# Patient Record
Sex: MALE | Race: OTHER | Employment: FULL TIME | URBAN - METROPOLITAN AREA
[De-identification: names, ages, dates, MRNs, and addresses within clinical notes are randomized per-mention and may not be internally consistent; named-entity substitution may affect disease eponyms.]

---

## 2017-02-13 ENCOUNTER — OFFICE VISIT (OUTPATIENT)
Dept: FAMILY MEDICINE CLINIC | Age: 37
End: 2017-02-13

## 2017-02-13 VITALS
HEIGHT: 72 IN | HEART RATE: 119 BPM | RESPIRATION RATE: 22 BRPM | DIASTOLIC BLOOD PRESSURE: 102 MMHG | TEMPERATURE: 95.9 F | WEIGHT: 213 LBS | SYSTOLIC BLOOD PRESSURE: 131 MMHG | BODY MASS INDEX: 28.85 KG/M2 | OXYGEN SATURATION: 99 %

## 2017-02-13 DIAGNOSIS — M54.5 ACUTE RIGHT-SIDED LOW BACK PAIN, WITH SCIATICA PRESENCE UNSPECIFIED: Primary | ICD-10-CM

## 2017-02-13 RX ORDER — OXYCODONE AND ACETAMINOPHEN 5; 325 MG/1; MG/1
1 TABLET ORAL
Qty: 10 TAB | Refills: 0 | Status: SHIPPED | OUTPATIENT
Start: 2017-02-13 | End: 2017-04-28 | Stop reason: ALTCHOICE

## 2017-02-13 RX ORDER — KETOROLAC TROMETHAMINE 30 MG/ML
60 INJECTION, SOLUTION INTRAMUSCULAR; INTRAVENOUS ONCE
Qty: 2 VIAL | Refills: 0 | Status: SHIPPED | COMMUNITY
Start: 2017-02-13 | End: 2017-02-13

## 2017-02-13 RX ORDER — ALPRAZOLAM 0.5 MG/1
TABLET ORAL
COMMUNITY

## 2017-02-13 RX ORDER — TIZANIDINE 4 MG/1
4 TABLET ORAL
Qty: 30 TAB | Refills: 0 | Status: SHIPPED | OUTPATIENT
Start: 2017-02-13 | End: 2017-04-28 | Stop reason: ALTCHOICE

## 2017-02-13 NOTE — PATIENT INSTRUCTIONS
Over the next 3 days please ice the back regularly for 15 minutes every 3 hours while awake. Avoid lifting greater than 10 pounds over the next week. Please use ibuprofen 800mg three times a day with food for the next week, take with this food. Engage in stretches below in  3 days, heat up first with heating pad for 10 minutes and then cool down after with ice for 15 minutes. Try to do these exercises twice daily. Back Stretches: Exercises  Your Care Instructions  Here are some examples of exercises for stretching your back. Start each exercise slowly. Ease off the exercise if you start to have pain. Your doctor or physical therapist will tell you when you can start these exercises and which ones will work best for you. How to do the exercises  Overhead stretch    1. Stand comfortably with your feet shoulder-width apart. 2. Looking straight ahead, raise both arms over your head and reach toward the ceiling. Do not allow your head to tilt back. 3. Hold for 15 to 30 seconds, then lower your arms to your sides. 4. Repeat 2 to 4 times. Side stretch    1. Stand comfortably with your feet shoulder-width apart. 2. Raise one arm over your head, and then lean to the other side. 3. Slide your hand down your leg as you let the weight of your arm gently stretch your side muscles. Hold for 15 to 30 seconds. 4. Repeat 2 to 4 times on each side. Press-up    1. Lie on your stomach, supporting your body with your forearms. 2. Press your elbows down into the floor to raise your upper back. As you do this, relax your stomach muscles and allow your back to arch without using your back muscles. As your press up, do not let your hips or pelvis come off the floor. 3. Hold for 15 to 30 seconds, then relax. 4. Repeat 2 to 4 times. Relax and rest    1. Lie on your back with a rolled towel under your neck and a pillow under your knees. Extend your arms comfortably to your sides.   2. Relax and breathe normally. 3. Remain in this position for about 10 minutes. 4. If you can, do this 2 or 3 times each day. Follow-up care is a key part of your treatment and safety. Be sure to make and go to all appointments, and call your doctor if you are having problems. It's also a good idea to know your test results and keep a list of the medicines you take. Where can you learn more? Go to http://alize-maryann.info/. Enter O325 in the search box to learn more about \"Back Stretches: Exercises. \"  Current as of: May 23, 2016  Content Version: 11.1  © 1844-3223 [employer]. Care instructions adapted under license by "[employer] (which disclaims liability or warranty for this information). If you have questions about a medical condition or this instruction, always ask your healthcare professional. [address] any warranty or liability for your use of this information.

## 2017-02-13 NOTE — PROGRESS NOTES
Caitlyn Edwards is a 43 y.o. male in today with c/o back injury this morning at home. Learning assessment previously completed; primary language is [place]. 1. Have you been to the ER, urgent care clinic since your last visit? Hospitalized since your last visit? No    2. Have you seen or consulted any other health care providers outside of the [address] [place] [address] since your last visit? Include any pap smears or colon screening.  No

## 2017-02-13 NOTE — LETTER
NOTIFICATION RETURN TO WORK / SCHOOL 
 
2/13/2017 2:45 PM 
 
Mr. Hernesto Carballo [address] [address] To Whom It May Concern: 
 
Hernesto Carballo is currently under the care of Doroteo Cordero. He will return to work/school on: 2/15/17 If there are questions or concerns please have the patient contact our office.  
 
 
 
Sincerely, 
 
 
[address] France MD

## 2017-02-13 NOTE — PROGRESS NOTES
Back Injury        HPI: Shazia Davey is a 43 y.o. male WHITE [place]   Here with acute back pain after moving a 35# post this morning. He was doing well earlier today as I had seen him this morning at an appointment with his son. He reports as soon as he moved the post he felt a tweak in his back. He has had off/on back pain in the past however reports this is severe. Past Medical History   Diagnosis Date    ADHD (attention deficit hyperactivity disorder)     GERD (gastroesophageal reflux disease)     Hematuria        Current Outpatient Prescriptions   Medication Sig    ALPRAZolam (XANAX) 0.5 mg tablet Take  by mouth.  ibuprofen (MOTRIN) 800 mg tablet Take 1 Tab by mouth every eight (8) hours as needed for Pain.  dextroamphetamine-amphetamine (ADDERALL) 30 mg tablet Take 30 mg by mouth three (3) times daily.  oxyCODONE-acetaminophen (PERCOCET) 5-325 mg per tablet Take 1 Tab by mouth every eight (8) hours as needed for Pain. Max Daily Amount: 3 Tabs. Indications: PAIN, medial meniscal tear    oxyCODONE-acetaminophen (PERCOCET 10)  mg per tablet Take 1 Tab by mouth every eight (8) hours as needed for Pain. Max Daily Amount: 3 Tabs.  TRAZODONE HCL (TRAZODONE PO) Take  by mouth. No current facility-administered medications for this visit. Allergies   Allergen Reactions    Prednisone Swelling    Phenergan [Promethazine] Other (comments)     hallucinations    Tramadol Other (comments)     Patient complains of headaches.        Past Surgical History   Procedure Laterality Date    Hx appendectomy      Hx hernia repair       double hernia    Pr excise varicocele      Hx hip replacement Right 2014    Hx hip replacement Left 2013       Family History   Problem Relation Age of Onset    Thyroid Disease Mother     Hypertension Father     Heart Attack Father     Cancer Sister      lung    Ovarian Cancer Sister     Alcohol abuse Brother        Social History Social History    Marital status:      Spouse name: N/A    Number of children: N/A    Years of education: N/A     Occupational History          Social History Main Topics    Smoking status: Never Smoker    Smokeless tobacco: Never Used    Alcohol use 3.6 oz/week     6 Standard drinks or equivalent per week      Comment: weekend    Drug use: No    Sexual activity: Yes     Partners: Female     Other Topics Concern    Not on file     Social History Narrative       Review of Systems   Musculoskeletal: Positive for back pain. Negative for falls. Neurological: Negative for focal weakness. Visit Vitals    BP (!) 131/102 (BP 1 Location: Left arm, BP Patient Position: Standing)    Pulse (!) 119    Temp 95.9 °F (35.5 °C) (Oral)    Resp 22    Ht 6' (1.829 m)    Wt 213 lb (96.6 kg)    SpO2 99%    BMI 28.89 kg/m2       Physical Exam   Constitutional: He appears well-developed and well-nourished. He appears distressed. HENT:   Head: Normocephalic and atraumatic. Right Ear: External ear normal.   Left Ear: External ear normal.   Musculoskeletal:        Lumbar back: He exhibits decreased range of motion, tenderness (Right paraspinals, intrinsics) and spasm (On right). He exhibits no bony tenderness, no swelling and no deformity. Skin: Skin is warm. He is diaphoretic. Nursing note and vitals reviewed. Assesment:  1. Acute right-sided low back pain, with sciatica presence unspecified    - ketorolac (TORADOL) 30 mg/mL (1 mL) injection; 2 mL by IntraMUSCular route once for 1 dose. Dispense: 2 Vial; Refill: 0  - oxyCODONE-acetaminophen (PERCOCET) 5-325 mg per tablet; Take 1 Tab by mouth two (2) times daily as needed for Pain. Max Daily Amount: 2 Tabs. Dispense: 10 Tab; Refill: 0  - tiZANidine (ZANAFLEX) 4 mg tablet; Take 1 Tab by mouth three (3) times daily as needed. Indications: MUSCLE SPASM  Dispense: 30 Tab;  Refill: 0        I have discussed the diagnosis with the patient and the intended management  The patient has received an after-visit summary and questions were answered concerning future plans. I have discussed medication usage, side effects and warnings with the patient as well. I have reviewed the plan of care with the patient, accepted their input and they are in agreement with the treatment goals. Follow-up Disposition:  Return if symptoms worsen or fail to improve.       Jonetta Schilder, MD                .

## 2017-02-13 NOTE — MR AVS SNAPSHOT
Visit Information Date & Time Provider Department Dept. Phone Encounter #  
 2/13/2017  2:15 PM Patricia Juan ManuelFrancine morrell [phone]  [phone] [id number] Follow-up Instructions Return if symptoms worsen or fail to improve. Upcoming Health Maintenance Date Due DTaP/Tdap/Td series (1 - Tdap) 3/9/1999 INFLUENZA AGE 9 TO ADULT 8/1/2016 Allergies as of 2/13/2017  Review Complete On: 2/13/2017 By: Pennie Upton LPN Severity Noted Reaction Type Reactions Prednisone Medium 10/19/2015    Swelling Phenergan [Promethazine]  05/23/2011    Other (comments)  
 hallucinations Tramadol  10/19/2015    Other (comments) Patient complains of headaches. Current Immunizations  Never Reviewed Name Date Influenza Vaccine PF 10/19/2015 Not reviewed this visit You Were Diagnosed With   
  
 Codes Comments Acute right-sided low back pain, with sciatica presence unspecified    -  Primary ICD-10-CM: M54.5 ICD-9-CM: 724.2 Vitals BP Pulse Temp Resp Height(growth percentile) Weight(growth percentile) (!) 147/112 (BP 1 Location: Left arm, BP Patient Position: Standing) (!) 122 95.9 °F (35.5 °C) (Oral) 22 6' (1.829 m) 213 lb (96.6 kg) SpO2 BMI Smoking Status 99% 28.89 kg/m2 Never Smoker Vitals History BMI and BSA Data Body Mass Index Body Surface Area  
 28.89 kg/m 2 2.22 m 2 Preferred Pharmacy Pharmacy Name Phone [address] [phone] Your Updated Medication List  
  
   
This list is accurate as of: 2/13/17  2:48 PM.  Always use your most recent med list.  
  
  
  
  
 ADDERALL 30 mg tablet Generic drug:  dextroamphetamine-amphetamine Take 30 mg by mouth three (3) times daily. ibuprofen 800 mg tablet Commonly known as:  MOTRIN Take 1 Tab by mouth every eight (8) hours as needed for Pain. ketorolac 30 mg/mL (1 mL) injection Commonly known as:  TORADOL  
2 mL by IntraMUSCular route once for 1 dose. oxyCODONE-acetaminophen 5-325 mg per tablet Commonly known as:  PERCOCET Take 1 Tab by mouth two (2) times daily as needed for Pain. Max Daily Amount: 2 Tabs. tiZANidine 4 mg tablet Commonly known as:  [place] Take 1 Tab by mouth three (3) times daily as needed. Indications: MUSCLE SPASM TRAZODONE PO Take  by mouth. XANAX 0.5 mg tablet Generic drug:  ALPRAZolam  
Take  by mouth. Prescriptions Printed Refills  
 oxyCODONE-acetaminophen (PERCOCET) 5-325 mg per tablet 0 Sig: Take 1 Tab by mouth two (2) times daily as needed for Pain. Max Daily Amount: 2 Tabs. Class: Print Route: Oral  
  
Prescriptions Sent to Pharmacy Refills  
 tiZANidine (ZANAFLEX) 4 mg tablet 0 Sig: Take 1 Tab by mouth three (3) times daily as needed. Indications: MUSCLE SPASM Class: Normal  
 Pharmacy: [address], [address] [place] [address]. Jesus Gallegos [address]  #: [phone] Route: Oral  
  
Follow-up Instructions Return if symptoms worsen or fail to improve. Patient Instructions Over the next 3 days please ice the back regularly for 15 minutes every 3 hours while awake. Avoid lifting greater than 10 pounds over the next week. Please use ibuprofen 800mg three times a day with food for the next week, take with this food. Engage in stretches below in  3 days, heat up first with heating pad for 10 minutes and then cool down after with ice for 15 minutes. Try to do these exercises twice daily. Back Stretches: Exercises Your Care Instructions Here are some examples of exercises for stretching your back. Start each exercise slowly. Ease off the exercise if you start to have pain. Your doctor or physical therapist will tell you when you can start these exercises and which ones will work best for you. How to do the exercises Overhead stretch 1. Stand comfortably with your feet shoulder-width apart. 2. Looking straight ahead, raise both arms over your head and reach toward the ceiling. Do not allow your head to tilt back. 3. Hold for 15 to 30 seconds, then lower your arms to your sides. 4. Repeat 2 to 4 times. Side stretch 1. Stand comfortably with your feet shoulder-width apart. 2. Raise one arm over your head, and then lean to the other side. 3. Slide your hand down your leg as you let the weight of your arm gently stretch your side muscles. Hold for 15 to 30 seconds. 4. Repeat 2 to 4 times on each side. Press-up 1. Lie on your stomach, supporting your body with your forearms. 2. Press your elbows down into the floor to raise your upper back. As you do this, relax your stomach muscles and allow your back to arch without using your back muscles. As your press up, do not let your hips or pelvis come off the floor. 3. Hold for 15 to 30 seconds, then relax. 4. Repeat 2 to 4 times. Relax and rest 
 
1. Lie on your back with a rolled towel under your neck and a pillow under your knees. Extend your arms comfortably to your sides. 2. Relax and breathe normally. 3. Remain in this position for about 10 minutes. 4. If you can, do this 2 or 3 times each day. Follow-up care is a key part of your treatment and safety. Be sure to make and go to all appointments, and call your doctor if you are having problems. It's also a good idea to know your test results and keep a list of the medicines you take. Where can you learn more? Go to http://alize-maryann.info/. Enter Y049 in the search box to learn more about \"Back Stretches: Exercises. \" Current as of: May 23, 2016 Content Version: 11.1 © 9689-4984 [employer].  Care instructions adapted under license by [employer] (which disclaims liability or warranty for this information). If you have questions about a medical condition or this instruction, always ask your healthcare professional. [facility] [address] any warranty or liability for your use of this information. Introducing [facility] SERVICES! Sangeetha Fisher introduces Hiveoo patient portal. Now you can access parts of your medical record, email your doctor's office, and request medication refills online. 1. In your internet browser, go to [email or web address] 2. Click on the First Time User? Click Here link in the Sign In box. You will see the New Member Sign Up page. 3. Enter your Hiveoo Access Code exactly as it appears below. You will not need to use this code after youve completed the sign-up process. If you do not sign up before the expiration date, you must request a new code. · Hiveoo Access Code: [email or web address] Expires: 5/14/2017  2:48 PM 
 
4. Enter the last four digits of your Social Security Number (xxxx) and Date of Birth (mm/dd/yyyy) as indicated and click Submit. You will be taken to the next sign-up page. 5. Create a Hiveoo ID. This will be your Hiveoo login ID and cannot be changed, so think of one that is secure and easy to remember. 6. Create a Hiveoo password. You can change your password at any time. 7. Enter your Password Reset Question and Answer. This can be used at a later time if you forget your password. 8. Enter your e-mail address. You will receive e-mail notification when new information is available in [address] 9. Click Sign Up. You can now view and download portions of your medical record. 10. Click the Download Summary menu link to download a portable copy of your medical information. If you have questions, please visit the Frequently Asked Questions section of the Hiveoo website. Remember, Hiveoo is NOT to be used for urgent needs. For medical emergencies, dial 911. Now available from your iPhone and Android! Please provide this summary of care documentation to your next provider. Your primary care clinician is listed as Carolina Lockhart. If you have any questions after today's visit, please call [phone].

## 2017-02-14 ENCOUNTER — TELEPHONE (OUTPATIENT)
Dept: FAMILY MEDICINE CLINIC | Age: 37
End: 2017-02-14

## 2017-02-14 NOTE — TELEPHONE ENCOUNTER
Pt states since this morning her is itchy all over and his face has a burning sensation and is red. Per Dr. India Perez, this is likely a reaction to the percocet. Advised pt to stop taking percocet and take benadryl for the reaction, if develops any difficulty breathing or feeling that throat is closing up seek emergency medical attention.

## 2017-03-15 ENCOUNTER — OFFICE VISIT (OUTPATIENT)
Dept: FAMILY MEDICINE CLINIC | Age: 37
End: 2017-03-15

## 2017-03-15 VITALS
SYSTOLIC BLOOD PRESSURE: 133 MMHG | OXYGEN SATURATION: 100 % | HEART RATE: 115 BPM | TEMPERATURE: 97.4 F | BODY MASS INDEX: 29.53 KG/M2 | DIASTOLIC BLOOD PRESSURE: 95 MMHG | HEIGHT: 72 IN | RESPIRATION RATE: 18 BRPM | WEIGHT: 218 LBS

## 2017-03-15 DIAGNOSIS — L03.312 CELLULITIS OF UPPER BACK EXCLUDING SCAPULAR REGION: Primary | ICD-10-CM

## 2017-03-15 RX ORDER — CEPHALEXIN 500 MG/1
500 CAPSULE ORAL 4 TIMES DAILY
Qty: 28 CAP | Refills: 0 | Status: SHIPPED | OUTPATIENT
Start: 2017-03-15 | End: 2017-03-22

## 2017-03-15 NOTE — PATIENT INSTRUCTIONS

## 2017-03-15 NOTE — PROGRESS NOTES
Hernesto Carballo is a 42 y.o. male c/o pain, redness and swelling over L uuper back x few days, today it burned really bad.

## 2017-03-16 NOTE — PROGRESS NOTES
Back Pain        HPI: Hernesto Carballo is a 42 y.o. male WHITE [place]   Here with red, painful, itchy rash on back of neck/upper back. Started this past Sunday. He had swelling and a bump at this area yesterday but this resolved. Denies any fevers. Past Medical History:   Diagnosis Date    ADHD (attention deficit hyperactivity disorder)     GERD (gastroesophageal reflux disease)     Hematuria        Current Outpatient Prescriptions   Medication Sig    cephALEXin (KEFLEX) 500 mg capsule Take 1 Cap by mouth four (4) times daily for 7 days.  ALPRAZolam (XANAX) 0.5 mg tablet Take  by mouth.  ibuprofen (MOTRIN) 800 mg tablet Take 1 Tab by mouth every eight (8) hours as needed for Pain.  TRAZODONE HCL (TRAZODONE PO) Take  by mouth.  dextroamphetamine-amphetamine (ADDERALL) 30 mg tablet Take 30 mg by mouth three (3) times daily.  oxyCODONE-acetaminophen (PERCOCET) 5-325 mg per tablet Take 1 Tab by mouth two (2) times daily as needed for Pain. Max Daily Amount: 2 Tabs.  tiZANidine (ZANAFLEX) 4 mg tablet Take 1 Tab by mouth three (3) times daily as needed. Indications: MUSCLE SPASM     No current facility-administered medications for this visit. Allergies   Allergen Reactions    Prednisone Swelling    Phenergan [Promethazine] Other (comments)     hallucinations    Tramadol Other (comments)     Patient complains of headaches.        Past Surgical History:   Procedure Laterality Date    EXCISE VARICOCELE      HX APPENDECTOMY      HX HERNIA REPAIR      double hernia    HX HIP REPLACEMENT Right 2014    HX HIP REPLACEMENT Left 2013       Family History   Problem Relation Age of Onset    Thyroid Disease Mother     Hypertension Father     Heart Attack Father     Cancer Sister      lung    Ovarian Cancer Sister     Alcohol abuse Brother        Social History     Social History    Marital status:      Spouse name: N/A    Number of children: N/A    Years of education: N/A     Occupational History          Social History Main Topics    Smoking status: Never Smoker    Smokeless tobacco: Never Used    Alcohol use 3.6 oz/week     6 Standard drinks or equivalent per week      Comment: weekend    Drug use: No    Sexual activity: Yes     Partners: Female     Other Topics Concern    Not on file     Social History Narrative           Visit Vitals    BP (!) 133/95 (BP 1 Location: Right arm, BP Patient Position: Sitting)    Pulse (!) 115    Temp 97.4 °F (36.3 °C) (Oral)    Resp 18    Ht 6' (1.829 m)    Wt 218 lb (98.9 kg)    SpO2 100%    BMI 29.57 kg/m2       PE  Physical Exam   Constitutional: He appears well-developed and well-nourished. No distress. HENT:   Head: Normocephalic and atraumatic. Left Ear: External ear normal.   Skin: He is not diaphoretic. Psychiatric: He has a normal mood and affect. Nursing note reviewed. Assesment:  1. Cellulitis of upper back excluding scapular region  Apply cool compress. Use ibuprofen prn pain. Keflex x 7 days  - cephALEXin (KEFLEX) 500 mg capsule; Take 1 Cap by mouth four (4) times daily for 7 days. Dispense: 28 Cap; Refill: 0        I have discussed the diagnosis with the patient and the intended management  The patient has received an after-visit summary and questions were answered concerning future plans. I have discussed medication usage, side effects and warnings with the patient as well. I have reviewed the plan of care with the patient, accepted their input and they are in agreement with the treatment goals. Follow-up Disposition:  Return if symptoms worsen or fail to improve.       Sybil Bowling MD                .

## 2017-04-25 ENCOUNTER — OFFICE VISIT (OUTPATIENT)
Dept: FAMILY MEDICINE CLINIC | Age: 37
End: 2017-04-25

## 2017-04-25 VITALS
HEART RATE: 77 BPM | BODY MASS INDEX: 29.53 KG/M2 | TEMPERATURE: 97.5 F | OXYGEN SATURATION: 99 % | RESPIRATION RATE: 18 BRPM | WEIGHT: 218 LBS | DIASTOLIC BLOOD PRESSURE: 100 MMHG | SYSTOLIC BLOOD PRESSURE: 133 MMHG | HEIGHT: 72 IN

## 2017-04-25 DIAGNOSIS — R03.0 BLOOD PRESSURE ELEVATED WITHOUT HISTORY OF HTN: ICD-10-CM

## 2017-04-25 DIAGNOSIS — G44.009 CLUSTER HEADACHE, NOT INTRACTABLE, UNSPECIFIED CHRONICITY PATTERN: Primary | ICD-10-CM

## 2017-04-25 RX ORDER — SUMATRIPTAN 50 MG/1
TABLET, FILM COATED ORAL
Qty: 12 TAB | Refills: 1 | Status: SHIPPED | OUTPATIENT
Start: 2017-04-25

## 2017-04-25 NOTE — MR AVS SNAPSHOT
Visit Information Date & Time Provider Department Dept. Phone Encounter #  
 4/25/2017  2:00 PM Tramaine Coates, [address] [phone] [id number] Follow-up Instructions Return if symptoms worsen or fail to improve. Upcoming Health Maintenance Date Due DTaP/Tdap/Td series (1 - Tdap) 3/9/1999 INFLUENZA AGE 9 TO ADULT 8/1/2016 Allergies as of 4/25/2017  Review Complete On: 4/25/2017 By: eJff Macedo LPN Severity Noted Reaction Type Reactions Prednisone Medium 10/19/2015    Swelling Phenergan [Promethazine]  05/23/2011    Other (comments)  
 hallucinations Tramadol  10/19/2015    Other (comments) Patient complains of headaches. Current Immunizations  Never Reviewed Name Date Influenza Vaccine PF 10/19/2015 Not reviewed this visit You Were Diagnosed With   
  
 Codes Comments Cluster headache, not intractable, unspecified chronicity pattern    -  Primary ICD-10-CM: G44.009 ICD-9-CM: 339.00 Vitals BP Pulse Temp Resp Height(growth percentile) Weight(growth percentile) (!) 133/100 (BP 1 Location: Right arm, BP Patient Position: Sitting) 77 97.5 °F (36.4 °C) (Oral) 18 6' (1.829 m) 218 lb (98.9 kg) SpO2 BMI Smoking Status 99% 29.57 kg/m2 Never Smoker Vitals History BMI and BSA Data Body Mass Index Body Surface Area  
 29.57 kg/m 2 2.24 m 2 Preferred Pharmacy Pharmacy Name Phone [address], [address] [place] [address] [phone] Your Updated Medication List  
  
   
This list is accurate as of: 4/25/17  3:01 PM.  Always use your most recent med list.  
  
  
  
  
 ADDERALL 30 mg tablet Generic drug:  dextroamphetamine-amphetamine Take 30 mg by mouth three (3) times daily. ibuprofen 800 mg tablet Commonly known as:  MOTRIN Take 1 Tab by mouth every eight (8) hours as needed for Pain. oxyCODONE-acetaminophen 5-325 mg per tablet Commonly known as:  PERCOCET Take 1 Tab by mouth two (2) times daily as needed for Pain. Max Daily Amount: 2 Tabs. SUMAtriptan 50 mg tablet Commonly known as:  IMITREX  
1 tab at onset ok to use second tablet if headache not resolved in 2 hours. Max of 2 daily tiZANidine 4 mg tablet Commonly known as:  Ty Nicolle Take 1 Tab by mouth three (3) times daily as needed. Indications: MUSCLE SPASM TRAZODONE PO Take  by mouth. XANAX 0.5 mg tablet Generic drug:  ALPRAZolam  
Take  by mouth. Prescriptions Sent to Pharmacy Refills SUMAtriptan (IMITREX) 50 mg tablet 1 Si tab at onset ok to use second tablet if headache not resolved in 2 hours. Max of 2 daily Class: Normal  
 Pharmacy: [facility] [id number] [place] [address], [facility] . Jesus Gallegos 47  #: [phone] Follow-up Instructions Return if symptoms worsen or fail to improve. Patient Instructions Cluster Headache: Care Instructions Your Care Instructions Cluster headaches are very painful. They happen on one side of the head and often start at night. They can last for 30 minutes to several hours. They usually occur in groups, or clusters, over weeks or months. You may have a stuffy nose and watery eyes during the headaches. The cause of cluster headaches is not known. Medicine may help prevent cluster headaches. You also can try to avoid things that trigger your headaches. Follow-up care is a key part of your treatment and safety. Be sure to make and go to all appointments, and call your doctor if you are having problems. It's also a good idea to know your test results and keep a list of the medicines you take. How can you care for yourself at home? · Watch for new symptoms with a headache. These include fever, weakness or numbness, vision changes, or confusion. They may be signs of a more serious problem. · Be safe with medicines. Take your medicines exactly as prescribed. Call your doctor if you think you are having a problem with your medicine. You will get more details on the specific medicines your doctor prescribes. · If your doctor recommends it, take an over-the-counter pain medicine, such as acetaminophen (Tylenol), ibuprofen (Advil, Motrin), or naproxen (Aleve). Read and follow all instructions on the label. · Do not take two or more pain medicines at the same time unless the doctor told you to. Many pain medicines have acetaminophen, which is Tylenol. Too much acetaminophen (Tylenol) can be harmful. · Carry medicine with you to quickly treat a headache. · Put ice or a cold pack on the area for 10 to 20 minutes at a time. Put a thin cloth between the ice and your skin. · If your doctor prescribed at-home oxygen therapy to stop a cluster headache, follow the directions for using it. To prevent cluster headaches · Keep a headache diary. Avoiding triggers may help you prevent headaches. Write down when a headache begins, how long it lasts, and what might have triggered it. This could include stress, alcohol, or certain foods. · Exercise daily to lower stress. · Limit caffeine by not drinking too much coffee, tea, or soda. But do not quit caffeine suddenly. This can also give you headaches. · Do not smoke or allow others to smoke around you. If you need help quitting, talk to your doctor about stop-smoking programs and medicines. These can increase your chances of quitting for good. · Tell your doctor if your headaches get worse and medicines do not help. You may need to try a different medicine. When should you call for help? Call 911 anytime you think you may need emergency care. For example, call if: 
· You have symptoms of a stroke. These may include: 
¨ Sudden numbness, tingling, weakness, or loss of movement in your face, arm, or leg, especially on only one side of your body. ¨ Sudden vision changes. ¨ Sudden trouble speaking. ¨ Sudden confusion or trouble understanding simple statements. ¨ Sudden problems with walking or balance. ¨ A sudden, severe headache that is different from past headaches. Call your doctor now or seek immediate medical care if: 
· You have a fever with a stiff neck or a severe headache. · You are sensitive to light or feel very sleepy or confused. · You have new nausea and vomiting and you cannot keep down food or liquids. Watch closely for changes in your health, and be sure to contact your doctor if: 
· You have a headache that does not get better within 1 or 2 days. · Your headaches get worse or happen more often. Where can you learn more? Go to http://alize-maryann.info/. Enter Z001 in the search box to learn more about \"Cluster Headache: Care Instructions. \" Current as of: October 14, 2016 Content Version: 11.2 © 1188-2168 [employer]. Care instructions adapted under license by [employer] (which disclaims liability or warranty for this information). If you have questions about a medical condition or this instruction, always ask your healthcare professional. [place] [address] any warranty or liability for your use of this information. Introducing [facility]! Tunde Hines introduces [employer] patient portal. Now you can access parts of your medical record, email your doctor's office, and request medication refills online. 1. In your internet browser, go to [email or web address] 2. Click on the First Time User? Click Here link in the Sign In box. You will see the New Member Sign Up page. 3. Enter your [employer] Access Code exactly as it appears below. You will not need to use this code after youve completed the sign-up process. If you do not sign up before the expiration date, you must request a new code. · [employer] Access Code: [email or web address] Expires: 5/14/2017  3:48 PM 
 
4. Enter the last four digits of your Social Security Number (xxxx) and Date of Birth (mm/dd/yyyy) as indicated and click Submit. You will be taken to the next sign-up page. 5. Create a [employer]ial Exchange ID. This will be your [employer] Exchange login ID and cannot be changed, so think of one that is secure and easy to remember. 6. Create a [employer] password. You can change your password at any time. 7. Enter your Password Reset Question and Answer. This can be used at a later time if you forget your password. 8. Enter your e-mail address. You will receive e-mail notification when new information is available in [address] 9. Click Sign Up. You can now view and download portions of your medical record. 10. Click the Download Summary menu link to download a portable copy of your medical information. If you have questions, please visit the Frequently Asked Questions section of the [employer] website. Remember, [employer] is NOT to be used for urgent needs. For medical emergencies, dial 911. Now available from your iPhone and Android! Please provide this summary of care documentation to your next provider. Your primary care clinician is listed as Kyara Enamorado. If you have any questions after today's visit, please call [phone].

## 2017-04-25 NOTE — PATIENT INSTRUCTIONS
Cluster Headache: Care Instructions  Your Care Instructions  Cluster headaches are very painful. They happen on one side of the head and often start at night. They can last for 30 minutes to several hours. They usually occur in groups, or clusters, over weeks or months. You may have a stuffy nose and watery eyes during the headaches. The cause of cluster headaches is not known. Medicine may help prevent cluster headaches. You also can try to avoid things that trigger your headaches. Follow-up care is a key part of your treatment and safety. Be sure to make and go to all appointments, and call your doctor if you are having problems. It's also a good idea to know your test results and keep a list of the medicines you take. How can you care for yourself at home? · Watch for new symptoms with a headache. These include fever, weakness or numbness, vision changes, or confusion. They may be signs of a more serious problem. · Be safe with medicines. Take your medicines exactly as prescribed. Call your doctor if you think you are having a problem with your medicine. You will get more details on the specific medicines your doctor prescribes. · If your doctor recommends it, take an over-the-counter pain medicine, such as acetaminophen (Tylenol), ibuprofen (Advil, Motrin), or naproxen (Aleve). Read and follow all instructions on the label. · Do not take two or more pain medicines at the same time unless the doctor told you to. Many pain medicines have acetaminophen, which is Tylenol. Too much acetaminophen (Tylenol) can be harmful. · Carry medicine with you to quickly treat a headache. · Put ice or a cold pack on the area for 10 to 20 minutes at a time. Put a thin cloth between the ice and your skin. · If your doctor prescribed at-home oxygen therapy to stop a cluster headache, follow the directions for using it. To prevent cluster headaches  · Keep a headache diary.  Avoiding triggers may help you prevent headaches. Write down when a headache begins, how long it lasts, and what might have triggered it. This could include stress, alcohol, or certain foods. · Exercise daily to lower stress. · Limit caffeine by not drinking too much coffee, tea, or soda. But do not quit caffeine suddenly. This can also give you headaches. · Do not smoke or allow others to smoke around you. If you need help quitting, talk to your doctor about stop-smoking programs and medicines. These can increase your chances of quitting for good. · Tell your doctor if your headaches get worse and medicines do not help. You may need to try a different medicine. When should you call for help? Call 911 anytime you think you may need emergency care. For example, call if:  · You have symptoms of a stroke. These may include:  ¨ Sudden numbness, tingling, weakness, or loss of movement in your face, arm, or leg, especially on only one side of your body. ¨ Sudden vision changes. ¨ Sudden trouble speaking. ¨ Sudden confusion or trouble understanding simple statements. ¨ Sudden problems with walking or balance. ¨ A sudden, severe headache that is different from past headaches. Call your doctor now or seek immediate medical care if:  · You have a fever with a stiff neck or a severe headache. · You are sensitive to light or feel very sleepy or confused. · You have new nausea and vomiting and you cannot keep down food or liquids. Watch closely for changes in your health, and be sure to contact your doctor if:  · You have a headache that does not get better within 1 or 2 days. · Your headaches get worse or happen more often. Where can you learn more? Go to http://alize-maryann.info/. Enter L587 in the search box to learn more about \"Cluster Headache: Care Instructions. \"  Current as of: October 14, 2016  Content Version: 11.2  © 0172-1462 [employer].  Care instructions adapted under license by [employer] (which disclaims liability or warranty for this information). If you have questions about a medical condition or this instruction, always ask your healthcare professional. [address] any warranty or liability for your use of this information.

## 2017-04-25 NOTE — PROGRESS NOTES
Dominik Chapman is a 42 y.o. male here today with c/o headache every day for a little more than 3 weeks. States the pain is around and behind his eyes. States he feels hot but doesn't think he has a fever. He has a post nasal drip     Learning assessment previously completed. 1. Have you been to the ER, urgent care clinic or hospitalized since your last visit? no    2. Have you seen or consulted any other health care providers outside of the [address] [place] [address] since your last visit (Include any pap smears or colon screening)? no     Do you have an Advanced Directive? no    Would you like information on Advanced Directives?  no

## 2017-04-26 NOTE — PROGRESS NOTES
Headache        HPI: Sangeeta Gutierrez is a 42 y.o. male [place]   Here with headache daily for the last 3 weeks; mostly behind both eyes but also can be occipital. He has been under more stress lately. He does endorse some photophobia. Also having abdominal discomfort. Reports increased stress at work and his Dad is in poor health. He does not have hx of recurrent headaches. He has tried use of Aleve, Ibuprfen and Advil but these have not been effective.           Past Medical History:   Diagnosis Date    ADHD (attention deficit hyperactivity disorder)     GERD (gastroesophageal reflux disease)     Hematuria        Current Outpatient Prescriptions   Medication Sig    SUMAtriptan (IMITREX) 50 mg tablet 1 tab at onset ok to use second tablet if headache not resolved in 2 hours. Max of 2 daily    ALPRAZolam (XANAX) 0.5 mg tablet Take  by mouth.  TRAZODONE HCL (TRAZODONE PO) Take  by mouth.  dextroamphetamine-amphetamine (ADDERALL) 30 mg tablet Take 30 mg by mouth three (3) times daily.  oxyCODONE-acetaminophen (PERCOCET) 5-325 mg per tablet Take 1 Tab by mouth two (2) times daily as needed for Pain. Max Daily Amount: 2 Tabs.  tiZANidine (ZANAFLEX) 4 mg tablet Take 1 Tab by mouth three (3) times daily as needed. Indications: MUSCLE SPASM    ibuprofen (MOTRIN) 800 mg tablet Take 1 Tab by mouth every eight (8) hours as needed for Pain. No current facility-administered medications for this visit. Allergies   Allergen Reactions    Prednisone Swelling    Phenergan [Promethazine] Other (comments)     hallucinations    Tramadol Other (comments)     Patient complains of headaches.        Past Surgical History:   Procedure Laterality Date    EXCISE VARICOCELE      HX APPENDECTOMY      HX HERNIA REPAIR      double hernia    HX HIP REPLACEMENT Right 2014    HX HIP REPLACEMENT Left 2013       Family History   Problem Relation Age of Onset    Thyroid Disease Mother    [facility] Hypertension Father     Heart Attack Father     Cancer Sister      lung    Ovarian Cancer Sister     Alcohol abuse Brother        Social History     Social History    Marital status:      Spouse name: N/A    Number of children: N/A    Years of education: N/A     Occupational History          Social History Main Topics    Smoking status: Never Smoker    Smokeless tobacco: Never Used    Alcohol use 3.6 oz/week     6 Standard drinks or equivalent per week      Comment: weekend    Drug use: No    Sexual activity: Yes     Partners: Female     Other Topics Concern    Not on file     Social History Narrative       Review of Systems   Constitutional: Negative for chills, fever and weight loss. HENT: Positive for congestion. Negative for ear pain. Respiratory: Negative for cough and sputum production. Cardiovascular: Negative for chest pain. Neurological: Positive for headaches. Negative for sensory change, speech change and seizures. Visit Vitals    BP (!) 133/100 (BP 1 Location: Right arm, BP Patient Position: Sitting)    Pulse 77    Temp 97.5 °F (36.4 °C) (Oral)    Resp 18    Ht 6' (1.829 m)    Wt 218 lb (98.9 kg)    SpO2 99%    BMI 29.57 kg/m2     Physical Exam   Constitutional: He is oriented to person, place, and time. He appears well-developed and well-nourished. No distress. HENT:   Right Ear: Hearing, tympanic membrane, external ear and ear canal normal.   Left Ear: Hearing, tympanic membrane, external ear and ear canal normal.   Mouth/Throat: Uvula is midline, oropharynx is clear and moist and mucous membranes are normal.   Cardiovascular: Normal rate, regular rhythm, normal heart sounds and intact distal pulses. No murmur heard. Pulmonary/Chest: Effort normal and breath sounds normal.   Neurological: He is alert and oriented to person, place, and time. No cranial nerve deficit. Skin: Skin is warm. He is not diaphoretic.    Psychiatric: He has a normal mood and affect. Nursing note and vitals reviewed. Assesment:  1. Cluster headache, not intractable, unspecified chronicity pattern  Patient treated with high flow oxygen 10 LPM for 15 minutes, provided some mild relief but did not resolve headache. Start triptan medication  - SUMAtriptan (IMITREX) 50 mg tablet; 1 tab at onset ok to use second tablet if headache not resolved in 2 hours. Max of 2 daily  Dispense: 12 Tab; Refill: 1    2. Blood pressure elevated without history of HTN  Likely due to #1      I have discussed the diagnosis with the patient and the intended management  The patient has received an after-visit summary and questions were answered concerning future plans. I have discussed medication usage, side effects and warnings with the patient as well. I have reviewed the plan of care with the patient, accepted their input and they are in agreement with the treatment goals. Follow-up Disposition:  Return if symptoms worsen or fail to improve.       Eulogio Singh MD                .

## 2017-04-28 ENCOUNTER — OFFICE VISIT (OUTPATIENT)
Dept: FAMILY MEDICINE CLINIC | Age: 37
End: 2017-04-28

## 2017-04-28 VITALS
TEMPERATURE: 97.6 F | BODY MASS INDEX: 29.39 KG/M2 | DIASTOLIC BLOOD PRESSURE: 75 MMHG | SYSTOLIC BLOOD PRESSURE: 116 MMHG | HEART RATE: 93 BPM | RESPIRATION RATE: 18 BRPM | OXYGEN SATURATION: 99 % | WEIGHT: 217 LBS | HEIGHT: 72 IN

## 2017-04-28 DIAGNOSIS — G44.001 INTRACTABLE CLUSTER HEADACHE SYNDROME, UNSPECIFIED CHRONICITY PATTERN: Primary | ICD-10-CM

## 2017-04-28 DIAGNOSIS — I15.9 SECONDARY HYPERTENSION: ICD-10-CM

## 2017-04-28 RX ORDER — KETOROLAC TROMETHAMINE 30 MG/ML
60 INJECTION, SOLUTION INTRAMUSCULAR; INTRAVENOUS ONCE
Qty: 1 VIAL | Refills: 0 | Status: SHIPPED | COMMUNITY
Start: 2017-04-28 | End: 2017-04-28

## 2017-04-28 RX ORDER — CLONIDINE HYDROCHLORIDE 0.1 MG/1
0.1 TABLET ORAL ONCE
Qty: 1 TAB | Refills: 0 | Status: SHIPPED | COMMUNITY
Start: 2017-04-28 | End: 2017-04-28

## 2017-04-28 RX ORDER — VERAPAMIL HYDROCHLORIDE 40 MG/1
40 TABLET ORAL
Qty: 90 TAB | Refills: 0 | Status: SHIPPED | OUTPATIENT
Start: 2017-04-28 | End: 2017-05-24

## 2017-04-28 NOTE — PATIENT INSTRUCTIONS

## 2017-04-28 NOTE — PROGRESS NOTES
John Phelps is a 42 y.o. male here today with c/o ongoing HA. Has not gone away since his last visit here. He was given Imitrex which doesn't really help and gave him bad side effects. Both hands went numb and he has chest tightness. Patients BP is elevated today and he states he has been on BP meds before. Hasn't taken any meds for 19 months. Learning assessment previously completed. 1. Have you been to the ER, urgent care clinic or hospitalized since your last visit? no    2. Have you seen or consulted any other health care providers outside of the [address] [place] [address] since your last visit (Include any pap smears or colon screening)? no    Do you have an Advanced Directive? no    Would you like information on Advanced Directives?  Has paper work

## 2017-04-28 NOTE — PROGRESS NOTES
SUBJECTIVE:  Miriam Yoon is a 42 y.o. male who complains of headaches which is ongoing. Description of pain: unilateral in the right frontal area and daily. Associated symptoms: light sensitivity. Pain relief: unable to obtain relief with triptan therapy, states that he has been having \"bad side effects\" with Imitrex which has been taking everyday. Precipitating factors: computer use as he uses it daily at work; has been having difficulty working because of light sensitivity. He does not a history of recent head injury. Review of Systems - no TIA or stroke-like symptoms, no amaurosis, diplopia, abnormal speech, unilateral numbness or weakness, stable neurological symptoms unchanged from prior visits. Current Outpatient Prescriptions   Medication Sig Dispense Refill    SUMAtriptan (IMITREX) 50 mg tablet 1 tab at onset ok to use second tablet if headache not resolved in 2 hours. Max of 2 daily 12 Tab 1    ALPRAZolam (XANAX) 0.5 mg tablet Take  by mouth.  ibuprofen (MOTRIN) 800 mg tablet Take 1 Tab by mouth every eight (8) hours as needed for Pain. 40 Tab 1    TRAZODONE HCL (TRAZODONE PO) Take  by mouth.  dextroamphetamine-amphetamine (ADDERALL) 30 mg tablet Take 30 mg by mouth three (3) times daily.  oxyCODONE-acetaminophen (PERCOCET) 5-325 mg per tablet Take 1 Tab by mouth two (2) times daily as needed for Pain. Max Daily Amount: 2 Tabs. 10 Tab 0    tiZANidine (ZANAFLEX) 4 mg tablet Take 1 Tab by mouth three (3) times daily as needed. Indications: MUSCLE SPASM 30 Tab 0       OBJECTIVE:  Appearance: alert, well appearing, and in no distress, oriented to person, place, and time and in mild to moderate distress. Laying down in dark examination room with right eye closed or shielded during the visit. Eyes: bilateral pupils dilated 4 mm, reactive to light.   Neurological Exam: alert, oriented, normal speech, no focal findings or movement disorder noted, neck supple without rigidity, motor and sensory grossly normal bilaterally . ASSESSMENT:    ICD-10-CM ICD-9-CM    1. Intractable cluster headache syndrome, unspecified chronicity pattern G44.001 339.00    2. Secondary hypertension I15.9 405.99 verapamil (CALAN) 40 mg tablet       PLAN:  Clonidine 0.1 mg x1 given to him in the office and 60 mg of Toradol IM also given x 1. BP decreased and headache also got better rating 1-2/10. Opening his right eye better. Recommendations: lie in darkened room and apply cold packs prn for pain, side effect profile discussed in detail. BP elevated again today; looked in the chart and it has been elevated most of the time for quite some time which could be a contributing factor to his headaches. States that he had a history of Hypertension and was on medication although he does not remember the name of the medication. Used to drink and when he stopped drinking his BP dropped and BP medication was discontinued 19 months ago. Will restart him on medication. Follow-up Disposition:  Return in about 1 week (around 5/5/2017) for For Hypertension and Headache.   MAURICE Rosas  4/28/2017  3:02 PM

## 2017-04-28 NOTE — MR AVS SNAPSHOT
Visit Information Date & Time Provider Department Dept. Phone Encounter #  
 4/28/2017  2:00 PM Jessica Galeano, [address] [facility] [phone] [id number] Follow-up Instructions Return in about 1 week (around 5/5/2017). Upcoming Health Maintenance Date Due DTaP/Tdap/Td series (1 - Tdap) 3/9/1999 INFLUENZA AGE 9 TO ADULT 8/1/2016 Allergies as of 4/28/2017  Review Complete On: 4/28/2017 By: MAURICE Ndiaye Severity Noted Reaction Type Reactions Prednisone Medium 10/19/2015    Swelling Phenergan [Promethazine]  05/23/2011    Other (comments)  
 hallucinations Tramadol  10/19/2015    Other (comments) Patient complains of headaches. Current Immunizations  Never Reviewed Name Date Influenza Vaccine PF 10/19/2015 Not reviewed this visit You Were Diagnosed With   
  
 Codes Comments Intractable cluster headache syndrome, unspecified chronicity pattern    -  Primary ICD-10-CM: G44.001 ICD-9-CM: 339.00 Secondary hypertension     ICD-10-CM: I15.9 ICD-9-CM: 405.99 Vitals BP Pulse Temp Resp Height(growth percentile) 116/75 (BP 1 Location: Right arm, BP Patient Position: Lying left side) 93 97.6 °F (36.4 °C) (Oral) 18 6' (1.829 m) Weight(growth percentile) SpO2 BMI Smoking Status 217 lb (98.4 kg) 99% 29.43 kg/m2 Never Smoker Vitals History BMI and BSA Data Body Mass Index Body Surface Area  
 29.43 kg/m 2 2.24 m 2 Preferred Pharmacy Pharmacy Name Phone [address], [address] [place] [address] [phone] Your Updated Medication List  
  
   
This list is accurate as of: 4/28/17  2:48 PM.  Always use your most recent med list.  
  
  
  
  
 ADDERALL 30 mg tablet Generic drug:  dextroamphetamine-amphetamine Take 30 mg by mouth three (3) times daily. cloNIDine HCl 0.1 mg tablet Commonly known as:  CATAPRES  
 Take 1 Tab by mouth once for 1 dose. Indications: hypertension  
  
 ibuprofen 800 mg tablet Commonly known as:  MOTRIN Take 1 Tab by mouth every eight (8) hours as needed for Pain.  
  
 ketorolac 30 mg/mL (1 mL) injection Commonly known as:  TORADOL  
2 mL by IntraMUSCular route once for 1 dose. Indications: Headache SUMAtriptan 50 mg tablet Commonly known as:  IMITREX  
1 tab at onset ok to use second tablet if headache not resolved in 2 hours. Max of 2 daily TRAZODONE PO Take  by mouth.  
  
 verapamil 40 mg tablet Commonly known as:  CALAN Take 1 Tab by mouth three (3) times daily (with meals). Indications: CLUSTER HEADACHE PREVENTION, hypertension XANAX 0.5 mg tablet Generic drug:  ALPRAZolam  
Take  by mouth. Prescriptions Sent to Pharmacy Refills  
 verapamil (CALAN) 40 mg tablet 0 Sig: Take 1 Tab by mouth three (3) times daily (with meals). Indications: CLUSTER HEADACHE PREVENTION, hypertension Class: Normal  
 Pharmacy: [address], [address] [place] [address]. Jesus Gallegos 47  #: [phone] Route: Oral  
  
Follow-up Instructions Return in about 1 week (around 5/5/2017). Patient Instructions Headache: Care Instructions Your Care Instructions Headaches have many possible causes. Most headaches aren't a sign of a more serious problem, and they will get better on their own. Home treatment may help you feel better faster. The doctor has checked you carefully, but problems can develop later. If you notice any problems or new symptoms, get medical treatment right away. Follow-up care is a key part of your treatment and safety. Be sure to make and go to all appointments, and call your doctor if you are having problems. It's also a good idea to know your test results and keep a list of the medicines you take. How can you care for yourself at home? · Do not drive if you have taken a prescription pain medicine. · Rest in a quiet, dark room until your headache is gone. Close your eyes and try to relax or go to sleep. Don't watch TV or read. · Put a cold, moist cloth or cold pack on the painful area for 10 to 20 minutes at a time. Put a thin cloth between the cold pack and your skin. · Use a warm, moist towel or a heating pad set on low to relax tight shoulder and neck muscles. · Have someone gently massage your neck and shoulders. · Take pain medicines exactly as directed. ¨ If the doctor gave you a prescription medicine for pain, take it as prescribed. ¨ If you are not taking a prescription pain medicine, ask your doctor if you can take an over-the-counter medicine. · Be careful not to take pain medicine more often than the instructions allow, because you may get worse or more frequent headaches when the medicine wears off. · Do not ignore new symptoms that occur with a headache, such as a fever, weakness or numbness, vision changes, or confusion. These may be signs of a more serious problem. To prevent headaches · Keep a headache diary so you can figure out what triggers your headaches. Avoiding triggers may help you prevent headaches. Record when each headache began, how long it lasted, and what the pain was like (throbbing, aching, stabbing, or dull). Write down any other symptoms you had with the headache, such as nausea, flashing lights or dark spots, or sensitivity to bright light or loud noise. Note if the headache occurred near your period. List anything that might have triggered the headache, such as certain foods (chocolate, cheese, wine) or odors, smoke, bright light, stress, or lack of sleep. · Find healthy ways to deal with stress. Headaches are most common during or right after stressful times. Take time to relax before and after you do something that has caused a headache in the past. 
· Try to keep your muscles relaxed by keeping good posture.  Check your jaw, face, neck, and shoulder muscles for tension, and try relaxing them. When sitting at a desk, change positions often, and stretch for 30 seconds each hour. · Get plenty of sleep and exercise. · Eat regularly and well. Long periods without food can trigger a headache. · Treat yourself to a massage. Some people find that regular massages are very helpful in relieving tension. · Limit caffeine by not drinking too much coffee, tea, or soda. But don't quit caffeine suddenly, because that can also give you headaches. · Reduce eyestrain from computers by blinking frequently and looking away from the computer screen every so often. Make sure you have proper eyewear and that your monitor is set up properly, about an arm's length away. · Seek help if you have depression or anxiety. Your headaches may be linked to these conditions. Treatment can both prevent headaches and help with symptoms of anxiety or depression. When should you call for help? Call 911 anytime you think you may need emergency care. For example, call if: 
· You have signs of a stroke. These may include: 
¨ Sudden numbness, paralysis, or weakness in your face, arm, or leg, especially on only one side of your body. ¨ Sudden vision changes. ¨ Sudden trouble speaking. ¨ Sudden confusion or trouble understanding simple statements. ¨ Sudden problems with walking or balance. ¨ A sudden, severe headache that is different from past headaches. Call your doctor now or seek immediate medical care if: 
· You have a new or worse headache. · Your headache gets much worse. Where can you learn more? Go to http://alize-maryann.info/. Enter M271 in the search box to learn more about \"Headache: Care Instructions. \" Current as of: October 14, 2016 Content Version: 11.2 © 3050-5533 [employer].  Care instructions adapted under license by [employer] (which disclaims liability or warranty for this information). If you have questions about a medical condition or this instruction, always ask your healthcare professional. [address] any warranty or liability for your use of this information. Introducing [facility]! [facility] introduces Xyo patient portal. Now you can access parts of your medical record, email your doctor's office, and request medication refills online. 1. In your internet browser, go to [email or web address] 2. Click on the First Time User? Click Here link in the Sign In box. You will see the New Member Sign Up page. 3. Enter your Xyo Access Code exactly as it appears below. You will not need to use this code after youve completed the sign-up process. If you do not sign up before the expiration date, you must request a new code. · Xyo Access Code: [email or web address] Expires: 5/14/2017  3:48 PM 
 
4. Enter the last four digits of your Social Security Number (xxxx) and Date of Birth (mm/dd/yyyy) as indicated and click Submit. You will be taken to the next sign-up page. 5. Create a Xyo ID. This will be your Xyo login ID and cannot be changed, so think of one that is secure and easy to remember. 6. Create a Xyo password. You can change your password at any time. 7. Enter your Password Reset Question and Answer. This can be used at a later time if you forget your password. 8. Enter your e-mail address. You will receive e-mail notification when new information is available in [address] 9. Click Sign Up. You can now view and download portions of your medical record. 10. Click the Download Summary menu link to download a portable copy of your medical information. If you have questions, please visit the Frequently Asked Questions section of the Xyo website. Remember, Xyo is NOT to be used for urgent needs. For medical emergencies, dial 911. Now available from your iPhone and Android! Please provide this summary of care documentation to your next provider. Your primary care clinician is listed as Jessica Murillo. If you have any questions after today's visit, please call [phone].

## 2017-05-22 ENCOUNTER — OFFICE VISIT (OUTPATIENT)
Dept: FAMILY MEDICINE CLINIC | Age: 37
End: 2017-05-22

## 2017-05-22 VITALS
DIASTOLIC BLOOD PRESSURE: 94 MMHG | RESPIRATION RATE: 18 BRPM | SYSTOLIC BLOOD PRESSURE: 143 MMHG | HEART RATE: 80 BPM | OXYGEN SATURATION: 98 % | WEIGHT: 226 LBS | TEMPERATURE: 96.6 F | HEIGHT: 72 IN | BODY MASS INDEX: 30.61 KG/M2

## 2017-05-22 DIAGNOSIS — S76.219A GROIN STRAIN, UNSPECIFIED LATERALITY, INITIAL ENCOUNTER: ICD-10-CM

## 2017-05-22 DIAGNOSIS — M54.50 ACUTE BILATERAL LOW BACK PAIN WITHOUT SCIATICA: ICD-10-CM

## 2017-05-22 DIAGNOSIS — M25.559 HIP PAIN, ACUTE, UNSPECIFIED LATERALITY: Primary | ICD-10-CM

## 2017-05-22 RX ORDER — OXYCODONE AND ACETAMINOPHEN 5; 325 MG/1; MG/1
1 TABLET ORAL
Qty: 20 TAB | Refills: 0 | Status: SHIPPED | OUTPATIENT
Start: 2017-05-22 | End: 2017-06-01

## 2017-05-22 RX ORDER — METHYLPREDNISOLONE 4 MG/1
TABLET ORAL
Qty: 1 DOSE PACK | Refills: 0 | Status: SHIPPED | OUTPATIENT
Start: 2017-05-22 | End: 2017-06-04

## 2017-05-22 RX ORDER — KETOROLAC TROMETHAMINE 30 MG/ML
60 INJECTION, SOLUTION INTRAMUSCULAR; INTRAVENOUS ONCE
Qty: 1 VIAL | Refills: 0
Start: 2017-05-22 | End: 2017-05-22

## 2017-05-22 NOTE — PROGRESS NOTES
Progress Note  Today's Date:  2017   Patient:  Angle Rosado  Patient :  1980    Subjective:   Angle Rosado is a 42 y.o. male patient of Dr. Felicity Bill who present with c/o low back, bilateral Hips and Groins pain that started last Saturday. States that he worked under his car that day and to get out from beneath the car, hurried due to the change in the wheather and the movement caused the symptoms. Has a history of bilateral hip replacement 2 and 4 years ago; states that both hips were bruised yesterday. Has been taking Motrin which has not helped. States that symptoms have gotten worse since then. Got up this morning went to work but was unable to sit because of excruciating pain in his groin, left greater than right. States that pain is constant but at times it shoot to the lower thigh and also upward especially in the right. Current Outpatient Meds and Allergies     Current Outpatient Prescriptions on File Prior to Visit   Medication Sig Dispense Refill    verapamil (CALAN) 40 mg tablet Take 1 Tab by mouth three (3) times daily (with meals). Indications: CLUSTER HEADACHE PREVENTION, hypertension 90 Tab 0    ALPRAZolam (XANAX) 0.5 mg tablet Take  by mouth.  ibuprofen (MOTRIN) 800 mg tablet Take 1 Tab by mouth every eight (8) hours as needed for Pain. 40 Tab 1    TRAZODONE HCL (TRAZODONE PO) Take  by mouth.  dextroamphetamine-amphetamine (ADDERALL) 30 mg tablet Take 30 mg by mouth three (3) times daily.  SUMAtriptan (IMITREX) 50 mg tablet 1 tab at onset ok to use second tablet if headache not resolved in 2 hours. Max of 2 daily 12 Tab 1     No current facility-administered medications on file prior to visit. These medications have been reviewed and reconciled with the patient during today's visit.       Allergies   Allergen Reactions    Prednisone Swelling    Phenergan [Promethazine] Other (comments)     hallucinations    Tramadol Other (comments) Patient complains of headaches. ROS:     CONST:   Denies fatigue, weight change, appetite change   NEURO:   Denies headaches, vision changes, dizziness, loss of consciousness  CV:      Denies chest pain, palpitations, orthopnea, PND  PULM:  Denies SOB, wheezing, cough, hemoptysis  GI:             Denies nausea, vomiting, abdominal pain, greasy stools, blood in stool,     diarrhea, constipation  :       Denies dysuria, hematuria, change in urine  MS:      Muscle/joint pain; no joint swelling  SKIN:        Bruising; Denies rashes,     Objective:     VS:    Visit Vitals    BP (!) 143/94    Pulse 80    Temp 96.6 °F (35.9 °C) (Oral)    Resp 18    Ht 6' (1.829 m)    Wt 226 lb (102.5 kg)    SpO2 98%    BMI 30.65 kg/m2       General:   well-nourished, well-groomed, alert, in distress, perspiring at times, trembling and crying at times. Cardiovasc:   Regular rate and rhythm, no murmurs, no rubs, no gallops,  Pulmonary:   Clear breath sounds bilaterally, good air movement, no wheezing, no rales, no rhonchi, normal respiratory effort  Abdomen:   Abdomen soft, nontender, nondistended, NABS,   MS:    Tenderness on palpation of bilateral groin, right more tender than left; have difficulty sitting during visit. Positive straight leg raised; states that he his unable to concentrate because of Pain in his right groin; spastic-like shooting pain; unable to fully examine patient due to pain. No edema, no tenderness with palpation of calves, warm and well-perfused, pulse palpable  Neuro:   Alert, conversant, appropriate, following commands, no focal deficits. Assessment:       1. Hip pain, acute, unspecified laterality    2. Groin strain, unspecified laterality, initial encounter    3.  Acute bilateral low back pain without sciatica        Plan:       Orders Placed This Encounter    KETOROLAC TROMETHAMINE INJ     Order Specific Question:   Dose     Answer:   30 mg/ml     Order Specific Question:   Site Answer:   RIGHT DELTOID     Order Specific Question:   Expiration Date     Answer:   3/1/2018     Order Specific Question:   Lot#     Answer:   -DK     Order Specific Question:        Answer:   Ki Mcpherson.     Order Specific Question:   Charge Quantity? Answer:   2     Order Specific Question:   Perfomed by/Witnessed by: Answer:   Sindhu Mina     Order Specific Question:   NDC#     Answer:   2137-7807-03    WI THER/PROPH/DIAG INJECTION, SUBCUT/IM    oxyCODONE-acetaminophen (PERCOCET) 5-325 mg per tablet     Sig: Take 1 Tab by mouth every six (6) hours as needed for Pain. Max Daily Amount: 4 Tabs. Dispense:  20 Tab     Refill:  0    methylPREDNISolone (MEDROL DOSEPACK) 4 mg tablet     Sig: Use as directed on pack. Dispense:  1 Dose Pack     Refill:  0    ketorolac tromethamine (TORADOL) 60 mg/2 mL soln     Si mL by IntraMUSCular route once for 1 dose. Dispense:  1 Vial     Refill:  0        verified  Toradol 60 mg IM given to patient; states that pain level has not changed 10/10. Pain seems to be muscular in nature since he had no problem using the bathroom before coming to the office. Due to the intensity of his pain informed him that ambulance will be called to bring him to the ED but he declined. His wife again encouraged him to go to the ED again he declined. Advised him to apply ice to the groin for 20 minutes 3 to four times day. Take prescribed medication as prescribed and follow up with Dr. Elizabeth Gibbs but if symptoms persist, to call 911 or go directly to the ED. Him and his wife verbalized understanding. I have discussed the diagnosis with the patient and the intended plan as seen in the above orders. The patient has received an after-visit summary along with patient information handout. I have discussed medication side effects and warnings with the patient as well. Pt verbalized understanding.     Follow-up Disposition:  Return if symptoms worsen or fail to improve.   MAURICE Wray  5/22/2017, 4:29 PM
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
## 2017-05-22 NOTE — PATIENT INSTRUCTIONS
Groin Strain: Care Instructions  Your Care Instructions  A groin strain is an injury that happens when you tear or overstretch (pull) a groin muscle. The groin muscles are in the area on either side of the body in the folds where the belly joins the legs. You can strain a groin muscle during exercise, such as running, skating, kicking in soccer, or playing basketball. It can happen when you lift, push, or pull heavy objects. You might pull a groin muscle when you fall. The injury can range from a minor pull to a more serious tear of the muscle. You may feel pain and tenderness that's worse when you squeeze your legs together. You may also have pain when you raise the knee of the injured side. There may be swelling or bruising in the groin area or inner thigh. If you have a bad strain, you may walk with a limp while it heals. Rest and other home care can help the muscle heal. Healing can take up to 3 weeks or more. Your doctor may want to see you again in 2 to 3 weeks. Follow-up care is a key part of your treatment and safety. Be sure to make and go to all appointments, and call your doctor if you are having problems. It's also a good idea to know your test results and keep a list of the medicines you take. How can you care for yourself at home? · Be safe with medicines. Read and follow all instructions on the label. ¨ If the doctor gave you a prescription medicine for pain, take it as prescribed. ¨ If you are not taking a prescription pain medicine, ask your doctor if you can take an over-the-counter medicine. · Rest and protect your injured or sore groin area for 1 to 2 weeks. Stop, change, or take a break from any activity that may be causing your pain or soreness. Do not do intense activities while you still have pain. · Put ice or a cold pack on your groin area for 10 to 20 minutes at a time. Try to do this every 1 to 2 hours for the next 3 days (when you are awake) or until the swelling goes down. Put a thin cloth between the ice and your skin. · After 2 or 3 days, if your swelling is gone, apply heat. Put a warm water bottle, a heating pad set on low, or a warm cloth on your groin area. Do not go to sleep with a heating pad on your skin. · If your doctor gave you crutches, make sure you use them as directed. · Wear snug shorts or underwear that support the injured area. When should you call for help? Call your doctor now or seek immediate medical care if:  · You have new or severe pain or swelling in the groin area. · Your groin or upper thigh is cool or pale or changes color. · You have tingling, weakness, or numbness in your groin or leg. · You cannot move your leg. · You cannot put weight on your leg. Watch closely for changes in your health, and be sure to contact your doctor if:  · You do not get better as expected. Where can you learn more? Go to http://alize-maryann.info/. Enter O133 in the search box to learn more about \"Groin Strain: Care Instructions. \"  Current as of: May 23, 2016  Content Version: 11.2  © 7238-3572 [employer]. Care instructions adapted under license by [employer] (which disclaims liability or warranty for this information). If you have questions about a medical condition or this instruction, always ask your healthcare professional. [place] [address] any warranty or liability for your use of this information.

## 2017-05-24 ENCOUNTER — OFFICE VISIT (OUTPATIENT)
Dept: FAMILY MEDICINE CLINIC | Age: 37
End: 2017-05-24

## 2017-05-24 VITALS
RESPIRATION RATE: 22 BRPM | DIASTOLIC BLOOD PRESSURE: 108 MMHG | WEIGHT: 222.2 LBS | OXYGEN SATURATION: 96 % | TEMPERATURE: 97.7 F | SYSTOLIC BLOOD PRESSURE: 152 MMHG | HEIGHT: 72 IN | HEART RATE: 82 BPM | BODY MASS INDEX: 30.1 KG/M2

## 2017-05-24 DIAGNOSIS — N50.82 SCROTAL PAIN: ICD-10-CM

## 2017-05-24 DIAGNOSIS — R10.32 GROIN PAIN, LEFT: Primary | ICD-10-CM

## 2017-05-24 RX ORDER — NAPROXEN 500 MG/1
500 TABLET ORAL 2 TIMES DAILY WITH MEALS
Qty: 20 TAB | Refills: 0 | Status: SHIPPED | OUTPATIENT
Start: 2017-05-24 | End: 2017-06-04

## 2017-05-24 NOTE — PROGRESS NOTES
Jacobo Jane is a 42 y.o. male in today with c/o continued back and groin pain 9/10. Learning assessment previously completed; primary language is [place]. 1. Have you been to the ER, urgent care clinic since your last visit? Hospitalized since your last visit? No    2. Have you seen or consulted any other health care providers outside of the [address] [place] [address] since your last visit? Include any pap smears or colon screening.  No

## 2017-05-24 NOTE — MR AVS SNAPSHOT
Visit Information Date & Time Provider Department Dept. Phone Encounter #  
 5/24/2017 12:30 PM Francine Camara [phone]  [phone] [id number] Upcoming Health Maintenance Date Due DTaP/Tdap/Td series (1 - Tdap) 3/9/1999 INFLUENZA AGE 9 TO ADULT 8/1/2017 Allergies as of 5/24/2017  Review Complete On: 5/24/2017 By: Sagar Crawley LPN Severity Noted Reaction Type Reactions Prednisone Medium 10/24/2012    Swelling, Hives Other reaction(s): neurological reaction Rash & headaches Phenergan [Promethazine]  05/23/2011    Other (comments) HALLUCINATIONS 
hallucinations Tramadol  10/24/2012    Other (comments), Nausea Only Patient complains of headaches. Current Immunizations  Never Reviewed Name Date Influenza Vaccine PF 10/19/2015 Not reviewed this visit You Were Diagnosed With   
  
 Codes Comments Groin pain, left    -  Primary ICD-10-CM: R10.32 
ICD-9-CM: 789.09 Scrotal pain     ICD-10-CM: N50.82 ICD-9-CM: 292. 9 Vitals BP Pulse Temp Resp Height(growth percentile) Weight(growth percentile) (!) 152/108 (BP 1 Location: Right arm, BP Patient Position: Standing) 82 97.7 °F (36.5 °C) (Oral) 22 6' (1.829 m) 222 lb 3.2 oz (100.8 kg) SpO2 BMI Smoking Status 96% 30.14 kg/m2 Never Smoker Vitals History BMI and BSA Data Body Mass Index Body Surface Area  
 30.14 kg/m 2 2.26 m 2 Preferred Pharmacy Pharmacy Name Phone [address], [address] [place] [address] [phone] Your Updated Medication List  
  
   
This list is accurate as of: 5/24/17  1:18 PM.  Always use your most recent med list.  
  
  
  
  
 ADDERALL 30 mg tablet Generic drug:  dextroamphetamine-amphetamine Take 30 mg by mouth three (3) times daily. methylPREDNISolone 4 mg tablet Commonly known as:  Joana France Use as directed on pack. naproxen 500 mg tablet Commonly known as:  NAPROSYN Take 1 Tab by mouth two (2) times daily (with meals). oxyCODONE-acetaminophen 5-325 mg per tablet Commonly known as:  PERCOCET Take 1 Tab by mouth every six (6) hours as needed for Pain. Max Daily Amount: 4 Tabs. SUMAtriptan 50 mg tablet Commonly known as:  IMITREX  
1 tab at onset ok to use second tablet if headache not resolved in 2 hours. Max of 2 daily TRAZODONE PO Take  by mouth.  
  
 verapamil 40 mg tablet Commonly known as:  CALAN Take 1 Tab by mouth three (3) times daily (with meals). Indications: CLUSTER HEADACHE PREVENTION, hypertension XANAX 0.5 mg tablet Generic drug:  ALPRAZolam  
Take  by mouth. Prescriptions Sent to Pharmacy Refills  
 naproxen (NAPROSYN) 500 mg tablet 0 Sig: Take 1 Tab by mouth two (2) times daily (with meals). Class: Normal  
 Pharmacy: [address] [place] [address] [facility] Clinic #: [phone] Route: Oral  
  
To-Do List   
 05/24/2017 Imaging:  US COLOR FLW LTD   
  
 05/24/2017 Imaging:  US SCROTUM/TESTICLES   
  
 05/24/2017 2:30 PM  
  Appointment with [facility] [place] [address] at [address] [facility] ([phone]) GENERAL INSTRUCTIONS - If you have a hand-written prescription for this exam, you must bring it with you on the day of your exam. - Bring all relevant prior images from facilities outside of [facility]. Failure to provide images may delay reading by Radiologist. - Only patients will be allowed in the exam room. This includes children. - Children under the age of 15 may not be left unattended. - [facility] patients must have an armband and be accompanied by a caregiver or family member. - Rodney Harris may be responsible for any co-payments and deductibles as indicated by your insurance carrier.    APPOINTMENT CANCELLATION - To reschedule or cancel an appointment, please contact the Scheduling Department at [phone] Patient Instructions Hernia: Care Instructions Your Care Instructions A hernia develops when tissue bulges through a weak spot in the wall of your belly. The groin area and the navel are common areas for a hernia. A hernia can also develop near the area of a surgery you had before. Pressure from lifting, straining, or coughing can tear the weak area, causing the hernia to bulge and be painful. If you cannot push a hernia back into place, the tissue may become trapped outside the belly wall. If the hernia gets twisted and loses its blood supply, it will swell and die. This is called a strangulated hernia. It usually causes a lot of pain. It needs treatment right away. Some hernias need to be repaired to prevent a strangulated hernia. If your hernia causes symptoms or is large, you may need surgery. Follow-up care is a key part of your treatment and safety. Be sure to make and go to all appointments, and call your doctor if you are having problems. Its also a good idea to know your test results and keep a list of the medicines you take. How can you care for yourself at home? · Take care when lifting heavy objects. · Stay at a healthy weight. · Do not smoke. Smoking can cause coughing, which can cause your hernia to bulge. If you need help quitting, talk to your doctor about stop-smoking programs and medicines. These can increase your chances of quitting for good. · Talk with your doctor before wearing a corset or truss for a hernia. These devices are not recommended for treating hernias and sometimes can do more harm than good. There may be certain situations when your doctor thinks a truss would work, but these are rare. When should you call for help? Call your doctor now or seek immediate medical care if: 
· You have severe belly pain. · You have nausea or vomiting. · You have belly pain and are not passing gas or stool. · You cannot push the hernia back into place with gentle pressure when you are lying down. · The skin over the hernia turns red or becomes tender. Watch closely for changes in your health, and be sure to contact your doctor if: 
· You have new or increased pain. · You do not get better as expected. Where can you learn more? Go to http://alize-maryann.info/. Enter C129 in the search box to learn more about \"Hernia: Care Instructions. \" Current as of: August 9, 2016 Content Version: 11.2 © 2228-1760 Kam[employer]. Care instructions adapted under license by [employer] (which disclaims liability or warranty for this information). If you have questions about a medical condition or this instruction, always ask your healthcare professional. [address] any warranty or liability for your use of this information. Introducing [facility]! [facility] introduces ChartCube patient portal. Now you can access parts of your medical record, email your doctor's office, and request medication refills online. 1. In your internet browser, go to [email or web address] 2. Click on the First Time User? Click Here link in the Sign In box. You will see the New Member Sign Up page. 3. Enter your ChartCube Access Code exactly as it appears below. You will not need to use this code after youve completed the sign-up process. If you do not sign up before the expiration date, you must request a new code. · ChartCube Access Code: [facility] Expires: 8/21/2017  9:38 AM 
 
4. Enter the last four digits of your Social Security Number (xxxx) and Date of Birth (mm/dd/yyyy) as indicated and click Submit. You will be taken to the next sign-up page. 5. Create a ChartCube ID. This will be your ChartCube login ID and cannot be changed, so think of one that is secure and easy to remember. 6. Create a [employer] password. You can change your password at any time. 7. Enter your Password Reset Question and Answer. This can be used at a later time if you forget your password. 8. Enter your e-mail address. You will receive e-mail notification when new information is available in [address] 9. Click Sign Up. You can now view and download portions of your medical record. 10. Click the Download Summary menu link to download a portable copy of your medical information. If you have questions, please visit the Frequently Asked Questions section of the [employer] website. Remember, [employer] is NOT to be used for urgent needs. For medical emergencies, dial 911. Now available from your iPhone and Android! Please provide this summary of care documentation to your next provider. Your primary care clinician is listed as [address] [place] [address]. If you have any questions after today's visit, please call [phone].

## 2017-05-24 NOTE — PATIENT INSTRUCTIONS
Hernia: Care Instructions  Your Care Instructions    A hernia develops when tissue bulges through a weak spot in the wall of your belly. The groin area and the navel are common areas for a hernia. A hernia can also develop near the area of a surgery you had before. Pressure from lifting, straining, or coughing can tear the weak area, causing the hernia to bulge and be painful. If you cannot push a hernia back into place, the tissue may become trapped outside the belly wall. If the hernia gets twisted and loses its blood supply, it will swell and die. This is called a strangulated hernia. It usually causes a lot of pain. It needs treatment right away. Some hernias need to be repaired to prevent a strangulated hernia. If your hernia causes symptoms or is large, you may need surgery. Follow-up care is a key part of your treatment and safety. Be sure to make and go to all appointments, and call your doctor if you are having problems. Its also a good idea to know your test results and keep a list of the medicines you take. How can you care for yourself at home? · Take care when lifting heavy objects. · Stay at a healthy weight. · Do not smoke. Smoking can cause coughing, which can cause your hernia to bulge. If you need help quitting, talk to your doctor about stop-smoking programs and medicines. These can increase your chances of quitting for good. · Talk with your doctor before wearing a corset or truss for a hernia. These devices are not recommended for treating hernias and sometimes can do more harm than good. There may be certain situations when your doctor thinks a truss would work, but these are rare. When should you call for help? Call your doctor now or seek immediate medical care if:  · You have severe belly pain. · You have nausea or vomiting. · You have belly pain and are not passing gas or stool.   · You cannot push the hernia back into place with gentle pressure when you are lying down.  · The skin over the hernia turns red or becomes tender. Watch closely for changes in your health, and be sure to contact your doctor if:  · You have new or increased pain. · You do not get better as expected. Where can you learn more? Go to http://alize-maryann.info/. Enter C129 in the search box to learn more about \"Hernia: Care Instructions. \"  Current as of: August 9, 2016  Content Version: 11.2  © 7738-7407 [employer]. Care instructions adapted under license by [employer] (which disclaims liability or warranty for this information). If you have questions about a medical condition or this instruction, always ask your healthcare professional. [address] any warranty or liability for your use of this information.

## 2017-05-31 NOTE — PROGRESS NOTES
Groin Pain and Back Pain        HPI: Salinas Aviles is a 42 y.o. male WHITE [place]   Here in follow up of his left groin pain. He was seen here in office 2 days ago by MAURICE Sim. Given Toradol shot and norco however his pain did not improve. He reports today he now has left scrotal pain which he did not have at previous visit. Past Medical History:   Diagnosis Date    ADHD (attention deficit hyperactivity disorder)     GERD (gastroesophageal reflux disease)     Hematuria     Pancreatitis     Panic attack        Current Outpatient Prescriptions   Medication Sig    naproxen (NAPROSYN) 500 mg tablet Take 1 Tab by mouth two (2) times daily (with meals).  oxyCODONE-acetaminophen (PERCOCET) 5-325 mg per tablet Take 1 Tab by mouth every six (6) hours as needed for Pain. Max Daily Amount: 4 Tabs.  methylPREDNISolone (MEDROL DOSEPACK) 4 mg tablet Use as directed on pack.  ciprofloxacin HCl (CIPRO) 500 mg tablet Take 1 Tab by mouth two (2) times a day for 10 days.  ondansetron (ZOFRAN ODT) 4 mg disintegrating tablet Take 1 Tab by mouth every eight (8) hours as needed for Nausea.  HYDROcodone-acetaminophen (NORCO) 5-325 mg per tablet Take 1 Tab by mouth every six (6) hours as needed for Pain. Max Daily Amount: 4 Tabs.  SUMAtriptan (IMITREX) 50 mg tablet 1 tab at onset ok to use second tablet if headache not resolved in 2 hours. Max of 2 daily    ALPRAZolam (XANAX) 0.5 mg tablet Take  by mouth.  TRAZODONE HCL (TRAZODONE PO) Take  by mouth.  dextroamphetamine-amphetamine (ADDERALL) 30 mg tablet Take 30 mg by mouth three (3) times daily. No current facility-administered medications for this visit.         Allergies   Allergen Reactions    Prednisone Swelling and Hives     Other reaction(s): neurological reaction  Rash & headaches    Phenergan [Promethazine] Other (comments)     HALLUCINATIONS  hallucinations    Tramadol Other (comments) and Nausea Only     Patient complains of headaches. Past Surgical History:   Procedure Laterality Date    EXCISE VARICOCELE      HX APPENDECTOMY      HX HERNIA REPAIR      double hernia    HX HIP REPLACEMENT Right 2014    HX HIP REPLACEMENT Left 2013       Family History   Problem Relation Age of Onset    Thyroid Disease Mother     Hypertension Father     Heart Attack Father     Cancer Sister      lung    Ovarian Cancer Sister     Alcohol abuse Brother        Social History     Social History    Marital status:      Spouse name: N/A    Number of children: N/A    Years of education: N/A     Occupational History          Social History Main Topics    Smoking status: Never Smoker    Smokeless tobacco: Never Used    Alcohol use 3.6 oz/week     6 Standard drinks or equivalent per week      Comment: weekend    Drug use: No    Sexual activity: Yes     Partners: Female     Other Topics Concern    Not on file     Social History Narrative       Review of Systems   Constitutional: Negative for chills, fever and malaise/fatigue. Visit Vitals    BP (!) 152/108 (BP 1 Location: Right arm, BP Patient Position: Standing)    Pulse 82    Temp 97.7 °F (36.5 °C) (Oral)    Resp 22    Ht 6' (1.829 m)    Wt 222 lb 3.2 oz (100.8 kg)    SpO2 96%    BMI 30.14 kg/m2       Physical Exam   Constitutional: He appears well-developed and well-nourished. He appears distressed (in paiin). Abdominal: Hernia confirmed negative in the right inguinal area and confirmed negative in the left inguinal area. Genitourinary: Penis normal.   Genitourinary Comments: Tenderness in left groin and inferior aspect of scrotum, fullness noted of inferior aspect of scrotum   Lymphadenopathy: No inguinal adenopathy noted on the right or left side. Skin: He is not diaphoretic. Nursing note reviewed. Assesment:  1. Groin pain, left  Will obtain stat US of scrotum.  No significant findings other than dense tissue at inferior aspect of scrotum. Advised to go to ER if pain is intolerable. - US SCROTUM/TESTICLES; Future  - naproxen (NAPROSYN) 500 mg tablet; Take 1 Tab by mouth two (2) times daily (with meals). Dispense: 20 Tab; Refill: 0    2. Scrotal pain    - naproxen (NAPROSYN) 500 mg tablet; Take 1 Tab by mouth two (2) times daily (with meals). Dispense: 20 Tab; Refill: 0          I have discussed the diagnosis with the patient and the intended management  The patient has received an after-visit summary and questions were answered concerning future plans. I have discussed medication usage, side effects and warnings with the patient as well. I have reviewed the plan of care with the patient, accepted their input and they are in agreement with the treatment goals.          Follow-up Disposition: Not on File      Dionicio Varner MD                .

## 2017-06-01 ENCOUNTER — OFFICE VISIT (OUTPATIENT)
Dept: UROLOGY | Age: 37
End: 2017-06-01

## 2017-06-01 VITALS
SYSTOLIC BLOOD PRESSURE: 148 MMHG | BODY MASS INDEX: 28.99 KG/M2 | WEIGHT: 214 LBS | HEIGHT: 72 IN | OXYGEN SATURATION: 99 % | DIASTOLIC BLOOD PRESSURE: 101 MMHG | HEART RATE: 109 BPM

## 2017-06-01 DIAGNOSIS — N43.3 HYDROCELE, UNSPECIFIED HYDROCELE TYPE: ICD-10-CM

## 2017-06-01 DIAGNOSIS — N45.3 EPIDIDYMO-ORCHITIS: Primary | ICD-10-CM

## 2017-06-01 LAB
BILIRUB UR QL STRIP: NEGATIVE
GLUCOSE UR-MCNC: NEGATIVE MG/DL
KETONES P FAST UR STRIP-MCNC: NEGATIVE MG/DL
PH UR STRIP: 6.5 [PH] (ref 4.6–8)
PROT UR QL STRIP: NEGATIVE MG/DL
SP GR UR STRIP: 1.01 (ref 1–1.03)
UA UROBILINOGEN AMB POC: NORMAL (ref 0.2–1)
URINALYSIS CLARITY POC: CLEAR
URINALYSIS COLOR POC: YELLOW
URINE BLOOD POC: NEGATIVE
URINE LEUKOCYTES POC: NEGATIVE
URINE NITRITES POC: NEGATIVE

## 2017-06-01 RX ORDER — VERAPAMIL HYDROCHLORIDE 40 MG/1
TABLET ORAL
Refills: 0 | COMMUNITY
Start: 2017-05-01 | End: 2017-06-30 | Stop reason: SDUPTHER

## 2017-06-01 RX ORDER — HYDROMORPHONE HYDROCHLORIDE 4 MG/1
4 TABLET ORAL
Qty: 10 TAB | Refills: 0 | Status: SHIPPED | OUTPATIENT
Start: 2017-06-01 | End: 2017-06-04

## 2017-06-01 RX ORDER — KETOROLAC TROMETHAMINE 30 MG/ML
30 INJECTION, SOLUTION INTRAMUSCULAR; INTRAVENOUS ONCE
Qty: 1 VIAL | Refills: 0
Start: 2017-06-01 | End: 2017-06-04

## 2017-06-01 RX ORDER — GENTAMICIN SULFATE 40 MG/ML
160 INJECTION, SOLUTION INTRAMUSCULAR; INTRAVENOUS ONCE
Qty: 2 VIAL | Refills: 0 | Status: ON HOLD
Start: 2017-06-01 | End: 2017-06-02 | Stop reason: SDUPTHER

## 2017-06-01 NOTE — PROGRESS NOTES
Chief Complaint   Patient presents with    New Patient    Hydrocele    Epididymitis     epididymo-orchitis       HISTORY OF PRESENT ILLNESS:  Lary Monreal is a 42 y.o. male who presents today with about a week and a half history of slowly progressive and worsening left testicular pain. Apparently he was doing some straining at home working on his [place] about 3 or 4 days prior to the onset of left testicular pain. This progressed to the point where he went to the emergency room where over a period of time a CT scan and a scrotal ultrasound confirmed epididymoorchitis on the left but no abscesses were noted. He was placed on antibiotics and pain medication and now over the past week, the pain has slowly become worse and worse until today his pain is almost uncontrollable and unbearable. Pain medication which was oxycodone so does not seem to be controlling it. He denies any fever or chills. He has no gross hematuria and no significant dysuria. He works at [employer] so does not have a physical kind of job. And according to him, the pain is now much worse than it was even 4 days ago. He currently is taking Levaquin for the infection. ROS documented on the chart, refer to that for complete details.     Past Medical History:   Diagnosis Date    ADHD (attention deficit hyperactivity disorder)     GERD (gastroesophageal reflux disease)     Hematuria     Hypertension     Pancreatitis     Panic attack        Past Surgical History:   Procedure Laterality Date    EXCISE VARICOCELE      HX APPENDECTOMY      HX HERNIA REPAIR      double hernia    HX HIP REPLACEMENT Right 2014    HX HIP REPLACEMENT Left 2013       Social History   Substance Use Topics    Smoking status: Never Smoker    Smokeless tobacco: Never Used    Alcohol use No      Comment: weekend       Allergies   Allergen Reactions    Prednisone Swelling and Hives     Other reaction(s): neurological reaction  Rash & headaches    Phenergan [Promethazine] Other (comments)     HALLUCINATIONS  hallucinations    Tramadol Other (comments) and Nausea Only     Patient complains of headaches. Family History   Problem Relation Age of Onset    Thyroid Disease Mother     Hypertension Father     Heart Attack Father     Cancer Sister      lung    Ovarian Cancer Sister     Alcohol abuse Brother        Current Outpatient Prescriptions   Medication Sig Dispense Refill    verapamil (CALAN) 40 mg tablet   0    gentamicin (GARAMYCIN) 40 mg/mL injection 4 mL by IntraMUSCular route once for 1 dose. 2 Vial 0    HYDROmorphone (DILAUDID) 4 mg tablet Take 1 Tab by mouth every four (4) hours as needed for Pain. Max Daily Amount: 24 mg. 10 Tab 0    ketorolac (TORADOL) 30 mg/mL (1 mL) injection 1 mL by IntraVENous route once for 1 dose. 1 Vial 0    naproxen (NAPROSYN) 500 mg tablet Take 1 Tab by mouth two (2) times daily (with meals). 20 Tab 0    ciprofloxacin HCl (CIPRO) 500 mg tablet Take 1 Tab by mouth two (2) times a day for 10 days. 20 Tab 0    ALPRAZolam (XANAX) 0.5 mg tablet Take  by mouth.  TRAZODONE HCL (TRAZODONE PO) Take  by mouth.  dextroamphetamine-amphetamine (ADDERALL) 30 mg tablet Take 30 mg by mouth three (3) times daily.  ondansetron (ZOFRAN ODT) 4 mg disintegrating tablet Take 1 Tab by mouth every eight (8) hours as needed for Nausea. 8 Tab 0    methylPREDNISolone (MEDROL DOSEPACK) 4 mg tablet Use as directed on pack. 1 Dose Pack 0    SUMAtriptan (IMITREX) 50 mg tablet 1 tab at onset ok to use second tablet if headache not resolved in 2 hours. Max of 2 daily 12 Tab 1         PHYSICAL EXAMINATION:   Visit Vitals    BP (!) 148/101 (BP 1 Location: Left arm, BP Patient Position: Standing)    Pulse (!) 109    Ht 6' (1.829 m)    Wt 214 lb (97.1 kg)    SpO2 99%    BMI 29.02 kg/m2     Constitutional: WDWN, Pleasant and appropriate affect, No acute distress.     CV:  No peripheral swelling noted  Respiratory: No respiratory distress or difficulties  Abdomen:  No abdominal masses or tenderness. No CVA tenderness. No inguinal hernias noted.  Male:    BRUNA: Deferred because of pain. SCROTUM: The scrotum to me is edematous and hyperemic but really not grossly swollen as I would normally expect with a florid epididymoorchitis. He certainly does not have a significant hydrocele and has nothing to suggest cellulitis of the scrotum. The epididymal head and tail on the left are extremely tender much more so than on the right. PENIS: Urethral meatus normal in location and size. No urethral discharge. Skin: No jaundice. Neuro/Psych:  Alert and oriented x 3, affect appropriate. Lymphatic:   No enlarged inguinal lymph nodes. Results for orders placed or performed in visit on 06/01/17   AMB POC URINALYSIS DIP STICK AUTO W/O MICRO   Result Value Ref Range    Color (UA POC) Yellow     Clarity (UA POC) Clear     Glucose (UA POC) Negative Negative    Bilirubin (UA POC) Negative Negative    Ketones (UA POC) Negative Negative    Specific gravity (UA POC) 1.015 1.001 - 1.035    Blood (UA POC) Negative Negative    pH (UA POC) 6.5 4.6 - 8.0    Protein (UA POC) Negative Negative mg/dL    Urobilinogen (UA POC) 0.2 mg/dL 0.2 - 1    Nitrites (UA POC) Negative Negative    Leukocyte esterase (UA POC) Negative Negative         REVIEW OF LABS AND IMAGING:     Imaging Report Reviewed? YES     Images Reviewed? NO           Other Lab Data Reviewed? YES         ASSESSMENT:     ICD-10-CM ICD-9-CM    1.  Epididymo-orchitis N45.3 604.90 GARAMYCIN, GENTAMICIN INJECTION <= 80 MG      MI THER/PROPH/DIAG INJECTION, SUBCUT/IM      gentamicin (GARAMYCIN) 40 mg/mL injection      ketorolac (TORADOL) 30 mg/mL (1 mL) injection      KETOROLAC TROMETHAMINE INJ      MI THER/PROPH/DIAG INJECTION, SUBCUT/IM   2. Hydrocele, unspecified hydrocele type N43.3 603.9 AMB POC URINALYSIS DIP STICK AUTO W/O MICRO            PLAN / DISCUSSION: : I agree that he has a significant epididymoorchitis. I think I am going to offer him the 2 options of either coming into the hospital or trying  intramuscular gentamicin. He and his wife have some special needs children so he really does not want to come into the hospital today so I am going to give him 180 mg of gentamicin and 30 mg of Toradol for pain I do want him to come back to the office tomorrow morning for reevaluation and if he is not any better think the alternative would be to cut have him come into the hospital for intravenous IV therapy and another evaluation of his epididymitis. He is agreeable to that plan of action. The patient expresses understanding and agreement of the discussion and plan. Melly Garcia MD on 6/1/2017         Please note: This document has been produced using voice recognition software. Unrecognized errors in transcription may be present.

## 2017-06-01 NOTE — PATIENT INSTRUCTIONS
Epididymitis and Orchitis: Care Instructions  Your Care Instructions    Epididymitis is pain and swelling of the tube that is attached to each testicle. This tube is called the epididymis. Orchitis is pain and swelling of the testicle. Infection with bacteria often causes these conditions. Sexually transmitted infections (STIs) also can cause both conditions. This is often the case in men younger than 28. Other causes in boys and older men are infections from surgery or having a catheter that drains urine. The mumps virus also can cause orchitis. Anti-inflammatory or pain medicines can help with the pain. Antibiotics are used if the problem is caused by bacteria. They are not used if a virus is the cause. Your testicle may stay swollen for many days or even a few weeks. The doctor has checked you carefully, but problems can develop later. If you notice any problems or new symptoms, get medical treatment right away. Follow-up care is a key part of your treatment and safety. Be sure to make and go to all appointments, and call your doctor if you are having problems. It's also a good idea to know your test results and keep a list of the medicines you take. How can you care for yourself at home? · If your doctor prescribed antibiotics, take them as directed. Do not stop taking them just because you feel better. You need to take the full course of antibiotics. · Ask your doctor if you can take an over-the-counter pain medicine, such as acetaminophen (Tylenol), ibuprofen (Advil, Motrin), or naproxen (Aleve). Be safe with medicines. Read and follow all instructions on the label. · Limit your activity to what is comfortable. · Wear snug underwear or an athletic supporter. This can help reduce pain. · Apply either cold or heat to the swollen area. Use the one that works best for your pain. Sitting in a warm bath for 15 minutes twice a day will help reduce the swelling more quickly.   · If you have been told that an STI may have caused your condition, do not have sex until your doctor says it is safe. This will prevent spreading the infection. Tell your sex partner or partners that they need to be checked. They may need treatment. When should you call for help? Call your doctor now or seek immediate medical care if:  · Your pain gets worse. · You have a new or higher fever. · You have new or more swelling of your testicle. · You have new belly pain, or your pain gets worse. Watch closely for changes in your health, and be sure to contact your doctor if:  · You do not get better as expected. Where can you learn more? Go to http://alize-maryann.info/. Enter S360 in the search box to learn more about \"Epididymitis and Orchitis: Care Instructions. \"  Current as of: August 12, 2016  Content Version: 11.2  © 0078-1500 [employer]. Care instructions adapted under license by [employer] (which disclaims liability or warranty for this information). If you have questions about a medical condition or this instruction, always ask your healthcare professional. [address] any warranty or liability for your use of this information.

## 2017-06-01 NOTE — MR AVS SNAPSHOT
Visit Information Date & Time Provider Department Dept. Phone Encounter #  
 6/1/2017  1:30 PM Eli Motrensen, [facility] [phone] [id number] Your Appointments 6/2/2017 10:15 AM  
Office Visit with Eli Mortensen MD  
[facility] [address] [facility]) Appt Note: check up [address] [place] [facility] [address] [id number] [phone] [address] [facility] [address] Upcoming Health Maintenance Date Due DTaP/Tdap/Td series (1 - Tdap) 3/9/1999 INFLUENZA AGE 9 TO ADULT 8/1/2017 Allergies as of 6/1/2017  Review Complete On: 6/1/2017 By: Robert Marquez LPN Severity Noted Reaction Type Reactions Prednisone Medium 10/24/2012    Swelling, Hives Other reaction(s): neurological reaction Rash & headaches Phenergan [Promethazine]  05/23/2011    Other (comments) HALLUCINATIONS 
hallucinations Tramadol  10/24/2012    Other (comments), Nausea Only Patient complains of headaches. Current Immunizations  Never Reviewed Name Date Influenza Vaccine PF 10/19/2015 Not reviewed this visit You Were Diagnosed With   
  
 Codes Comments Hydrocele, unspecified hydrocele type    -  Primary ICD-10-CM: N43.3 ICD-9-CM: 603.9 Epididymo-orchitis     ICD-10-CM: N45.3 ICD-9-CM: 604.90 Vitals BP Pulse Height(growth percentile) Weight(growth percentile) SpO2 BMI  
 (!) 148/101 (BP 1 Location: Left arm, BP Patient Position: Standing) (!) 109 6' (1.829 m) 214 lb (97.1 kg) 99% 29.02 kg/m2 Smoking Status Never Smoker Vitals History BMI and BSA Data Body Mass Index Body Surface Area  
 29.02 kg/m 2 2.22 m 2 Preferred Pharmacy Pharmacy Name Phone [facility] [address], [address] [place] [address] [phone] Your Updated Medication List  
  
   
 This list is accurate as of: 6/1/17  3:11 PM.  Always use your most recent med list.  
  
  
  
  
 ADDERALL 30 mg tablet Generic drug:  dextroamphetamine-amphetamine Take 30 mg by mouth three (3) times daily. ciprofloxacin HCl 500 mg tablet Commonly known as:  CIPRO Take 1 Tab by mouth two (2) times a day for 10 days. gentamicin 40 mg/mL injection Commonly known as:  GARAMYCIN  
4 mL by IntraMUSCular route once for 1 dose. HYDROmorphone 4 mg tablet Commonly known as:  DILAUDID Take 1 Tab by mouth every four (4) hours as needed for Pain. Max Daily Amount: 24 mg.  
  
 methylPREDNISolone 4 mg tablet Commonly known as:  Sherif Servant Use as directed on pack. naproxen 500 mg tablet Commonly known as:  NAPROSYN Take 1 Tab by mouth two (2) times daily (with meals). ondansetron 4 mg disintegrating tablet Commonly known as:  ZOFRAN ODT Take 1 Tab by mouth every eight (8) hours as needed for Nausea. SUMAtriptan 50 mg tablet Commonly known as:  IMITREX  
1 tab at onset ok to use second tablet if headache not resolved in 2 hours. Max of 2 daily TRAZODONE PO Take  by mouth.  
  
 verapamil 40 mg tablet Commonly known as:  CALAN  
  
 XANAX 0.5 mg tablet Generic drug:  ALPRAZolam  
Take  by mouth. Prescriptions Printed Refills HYDROmorphone (DILAUDID) 4 mg tablet 0 Sig: Take 1 Tab by mouth every four (4) hours as needed for Pain. Max Daily Amount: 24 mg. Class: Print Route: Oral  
  
We Performed the Following AMB POC URINALYSIS DIP STICK AUTO W/O MICRO [33114 CPT(R)] GARAMYCIN, GENTAMICIN INJECTION <= 80 MG [ [facility]] NV THER/PROPH/DIAG INJECTION, SUBCUT/IM [id number] CPT(R)] Patient Instructions Epididymitis and Orchitis: Care Instructions Your Care Instructions Epididymitis is pain and swelling of the tube that is attached to each testicle. This tube is called the epididymis. Orchitis is pain and swelling of the testicle. Infection with bacteria often causes these conditions. Sexually transmitted infections (STIs) also can cause both conditions. This is often the case in men younger than 28. Other causes in boys and older men are infections from surgery or having a catheter that drains urine. The mumps virus also can cause orchitis. Anti-inflammatory or pain medicines can help with the pain. Antibiotics are used if the problem is caused by bacteria. They are not used if a virus is the cause. Your testicle may stay swollen for many days or even a few weeks. The doctor has checked you carefully, but problems can develop later. If you notice any problems or new symptoms, get medical treatment right away. Follow-up care is a key part of your treatment and safety. Be sure to make and go to all appointments, and call your doctor if you are having problems. It's also a good idea to know your test results and keep a list of the medicines you take. How can you care for yourself at home? · If your doctor prescribed antibiotics, take them as directed. Do not stop taking them just because you feel better. You need to take the full course of antibiotics. · Ask your doctor if you can take an over-the-counter pain medicine, such as acetaminophen (Tylenol), ibuprofen (Advil, Motrin), or naproxen (Aleve). Be safe with medicines. Read and follow all instructions on the label. · Limit your activity to what is comfortable. · Wear snug underwear or an athletic supporter. This can help reduce pain. · Apply either cold or heat to the swollen area. Use the one that works best for your pain. Sitting in a warm bath for 15 minutes twice a day will help reduce the swelling more quickly. · If you have been told that an STI may have caused your condition, do not have sex until your doctor says it is safe.  This will prevent spreading the infection. Tell your sex partner or partners that they need to be checked. They may need treatment. When should you call for help? Call your doctor now or seek immediate medical care if: 
· Your pain gets worse. · You have a new or higher fever. · You have new or more swelling of your testicle. · You have new belly pain, or your pain gets worse. Watch closely for changes in your health, and be sure to contact your doctor if: 
· You do not get better as expected. Where can you learn more? Go to http://alize-maryann.info/. Enter S360 in the search box to learn more about \"Epididymitis and Orchitis: Care Instructions. \" Current as of: August 12, 2016 Content Version: 11.2 © 5175-1485 [employer]. Care instructions adapted under license by [email or web address] (which disclaims liability or warranty for this information). If you have questions about a medical condition or this instruction, always ask your healthcare professional. [place] [address] any warranty or liability for your use of this information. Introducing [facility]! Tanis Epley introduces [employer] patient portal. Now you can access parts of your medical record, email your doctor's office, and request medication refills online. 1. In your internet browser, go to [email or web address] 2. Click on the First Time User? Click Here link in the Sign In box. You will see the New Member Sign Up page. 3. Enter your [employer] Access Code exactly as it appears below. You will not need to use this code after youve completed the sign-up process. If you do not sign up before the expiration date, you must request a new code. · [employer] Access Code: [facility] Expires: 8/21/2017  9:38 AM 
 
4. Enter the last four digits of your Social Security Number (xxxx) and Date of Birth (mm/dd/yyyy) as indicated and click Submit. You will be taken to the next sign-up page. 5. Create a [employer] ID. This will be your [employer] login ID and cannot be changed, so think of one that is secure and easy to remember. 6. Create a [employer] password. You can change your password at any time. 7. Enter your Password Reset Question and Answer. This can be used at a later time if you forget your password. 8. Enter your e-mail address. You will receive e-mail notification when new information is available in [address] 9. Click Sign Up. You can now view and download portions of your medical record. 10. Click the Download Summary menu link to download a portable copy of your medical information. If you have questions, please visit the Frequently Asked Questions section of the [employer] website. Remember, [employer] is NOT to be used for urgent needs. For medical emergencies, dial 911. Now available from your iPhone and Android! Please provide this summary of care documentation to your next provider. Your primary care clinician is listed as [address] [place] [address]. If you have any questions after today's visit, please call [phone].

## 2017-06-02 ENCOUNTER — APPOINTMENT (OUTPATIENT)
Dept: ULTRASOUND IMAGING | Age: 37
DRG: 728 | End: 2017-06-02
Attending: UROLOGY
Payer: COMMERCIAL

## 2017-06-02 ENCOUNTER — OFFICE VISIT (OUTPATIENT)
Dept: UROLOGY | Age: 37
End: 2017-06-02

## 2017-06-02 ENCOUNTER — HOSPITAL ENCOUNTER (INPATIENT)
Age: 37
LOS: 2 days | Discharge: HOME | DRG: 728 | End: 2017-06-04
Attending: UROLOGY | Admitting: UROLOGY
Payer: COMMERCIAL

## 2017-06-02 VITALS
DIASTOLIC BLOOD PRESSURE: 87 MMHG | OXYGEN SATURATION: 98 % | SYSTOLIC BLOOD PRESSURE: 149 MMHG | BODY MASS INDEX: 28.99 KG/M2 | WEIGHT: 214 LBS | HEIGHT: 72 IN | HEART RATE: 108 BPM

## 2017-06-02 DIAGNOSIS — R52 INTRACTABLE PAIN: ICD-10-CM

## 2017-06-02 DIAGNOSIS — N45.1 EPIDIDYMITIS, BILATERAL: Primary | ICD-10-CM

## 2017-06-02 DIAGNOSIS — N45.3 EPIDIDYMO-ORCHITIS: ICD-10-CM

## 2017-06-02 LAB
ALBUMIN SERPL BCP-MCNC: 4.4 G/DL (ref 3.4–5)
ALBUMIN/GLOB SERPL: 1.3 {RATIO} (ref 0.8–1.7)
ALP SERPL-CCNC: 100 U/L (ref 45–117)
ALT SERPL-CCNC: 50 U/L (ref 16–61)
ANION GAP BLD CALC-SCNC: 5 MMOL/L (ref 3–18)
AST SERPL W P-5'-P-CCNC: 29 U/L (ref 15–37)
BASOPHILS # BLD AUTO: 0 K/UL (ref 0–0.1)
BASOPHILS # BLD: 0 % (ref 0–2)
BILIRUB SERPL-MCNC: 1 MG/DL (ref 0.2–1)
BUN SERPL-MCNC: 21 MG/DL (ref 7–18)
BUN/CREAT SERPL: 25 (ref 12–20)
CALCIUM SERPL-MCNC: 9.7 MG/DL (ref 8.5–10.1)
CHLORIDE SERPL-SCNC: 104 MMOL/L (ref 100–108)
CO2 SERPL-SCNC: 30 MMOL/L (ref 21–32)
CREAT SERPL-MCNC: 0.83 MG/DL (ref 0.6–1.3)
DIFFERENTIAL METHOD BLD: NORMAL
EOSINOPHIL # BLD: 0.1 K/UL (ref 0–0.4)
EOSINOPHIL NFR BLD: 1 % (ref 0–5)
ERYTHROCYTE [DISTWIDTH] IN BLOOD BY AUTOMATED COUNT: 12.7 % (ref 11.6–14.5)
GLOBULIN SER CALC-MCNC: 3.5 G/DL (ref 2–4)
GLUCOSE SERPL-MCNC: 93 MG/DL (ref 74–99)
HCT VFR BLD AUTO: 44.2 % (ref 36–48)
HGB BLD-MCNC: 15.8 G/DL (ref 13–16)
LYMPHOCYTES # BLD AUTO: 46 % (ref 21–52)
LYMPHOCYTES # BLD: 3.6 K/UL (ref 0.9–3.6)
MCH RBC QN AUTO: 30.2 PG (ref 24–34)
MCHC RBC AUTO-ENTMCNC: 35.7 G/DL (ref 31–37)
MCV RBC AUTO: 84.4 FL (ref 74–97)
MONOCYTES # BLD: 0.6 K/UL (ref 0.05–1.2)
MONOCYTES NFR BLD AUTO: 7 % (ref 3–10)
NEUTS SEG # BLD: 3.6 K/UL (ref 1.8–8)
NEUTS SEG NFR BLD AUTO: 46 % (ref 40–73)
PLATELET # BLD AUTO: 163 K/UL (ref 135–420)
PMV BLD AUTO: 9.5 FL (ref 9.2–11.8)
POTASSIUM SERPL-SCNC: 3.7 MMOL/L (ref 3.5–5.5)
PROT SERPL-MCNC: 7.9 G/DL (ref 6.4–8.2)
RBC # BLD AUTO: 5.24 M/UL (ref 4.7–5.5)
SODIUM SERPL-SCNC: 139 MMOL/L (ref 136–145)
WBC # BLD AUTO: 7.8 K/UL (ref 4.6–13.2)

## 2017-06-02 PROCEDURE — 85025 COMPLETE CBC W/AUTO DIFF WBC: CPT | Performed by: UROLOGY

## 2017-06-02 PROCEDURE — 74011000258 HC RX REV CODE- 258: Performed by: UROLOGY

## 2017-06-02 PROCEDURE — 36415 COLL VENOUS BLD VENIPUNCTURE: CPT | Performed by: UROLOGY

## 2017-06-02 PROCEDURE — 76870 US EXAM SCROTUM: CPT

## 2017-06-02 PROCEDURE — 74011250636 HC RX REV CODE- 250/636: Performed by: UROLOGY

## 2017-06-02 PROCEDURE — 77030027138 HC INCENT SPIROMETER -A

## 2017-06-02 PROCEDURE — 74011258636 HC RX REV CODE- 258/636: Performed by: UROLOGY

## 2017-06-02 PROCEDURE — 65270000029 HC RM PRIVATE

## 2017-06-02 PROCEDURE — 87040 BLOOD CULTURE FOR BACTERIA: CPT | Performed by: UROLOGY

## 2017-06-02 PROCEDURE — 80053 COMPREHEN METABOLIC PANEL: CPT | Performed by: UROLOGY

## 2017-06-02 RX ORDER — CEFAZOLIN SODIUM 2 G/50ML
2 SOLUTION INTRAVENOUS EVERY 8 HOURS
Status: DISCONTINUED | OUTPATIENT
Start: 2017-06-02 | End: 2017-06-04 | Stop reason: HOSPADM

## 2017-06-02 RX ORDER — SODIUM CHLORIDE 0.9 % (FLUSH) 0.9 %
5-10 SYRINGE (ML) INJECTION EVERY 8 HOURS
Status: DISCONTINUED | OUTPATIENT
Start: 2017-06-02 | End: 2017-06-04 | Stop reason: HOSPADM

## 2017-06-02 RX ORDER — DEXTROSE, SODIUM CHLORIDE, SODIUM LACTATE, POTASSIUM CHLORIDE, AND CALCIUM CHLORIDE 5; .6; .31; .03; .02 G/100ML; G/100ML; G/100ML; G/100ML; G/100ML
125 INJECTION, SOLUTION INTRAVENOUS CONTINUOUS
Status: DISCONTINUED | OUTPATIENT
Start: 2017-06-02 | End: 2017-06-04 | Stop reason: HOSPADM

## 2017-06-02 RX ORDER — SODIUM CHLORIDE 0.9 % (FLUSH) 0.9 %
5-10 SYRINGE (ML) INJECTION AS NEEDED
Status: DISCONTINUED | OUTPATIENT
Start: 2017-06-02 | End: 2017-06-04 | Stop reason: HOSPADM

## 2017-06-02 RX ORDER — GENTAMICIN SULFATE 80 MG/100ML
80 INJECTION, SOLUTION INTRAVENOUS EVERY 8 HOURS
Status: DISCONTINUED | OUTPATIENT
Start: 2017-06-02 | End: 2017-06-02 | Stop reason: DRUGHIGH

## 2017-06-02 RX ORDER — HYDROMORPHONE HYDROCHLORIDE 2 MG/ML
2 INJECTION, SOLUTION INTRAMUSCULAR; INTRAVENOUS; SUBCUTANEOUS
Status: DISCONTINUED | OUTPATIENT
Start: 2017-06-02 | End: 2017-06-04

## 2017-06-02 RX ORDER — ONDANSETRON 2 MG/ML
4 INJECTION INTRAMUSCULAR; INTRAVENOUS
Status: DISCONTINUED | OUTPATIENT
Start: 2017-06-02 | End: 2017-06-04 | Stop reason: HOSPADM

## 2017-06-02 RX ADMIN — CEFAZOLIN SODIUM 2 G: 2 SOLUTION INTRAVENOUS at 21:07

## 2017-06-02 RX ADMIN — HYDROMORPHONE HYDROCHLORIDE 2 MG: 2 INJECTION, SOLUTION INTRAMUSCULAR; INTRAVENOUS; SUBCUTANEOUS at 17:13

## 2017-06-02 RX ADMIN — SODIUM CHLORIDE, SODIUM LACTATE, POTASSIUM CHLORIDE, CALCIUM CHLORIDE, AND DEXTROSE MONOHYDRATE 125 ML/HR: 600; 310; 30; 20; 5 INJECTION, SOLUTION INTRAVENOUS at 23:52

## 2017-06-02 RX ADMIN — HYDROMORPHONE HYDROCHLORIDE 2 MG: 2 INJECTION, SOLUTION INTRAMUSCULAR; INTRAVENOUS; SUBCUTANEOUS at 21:00

## 2017-06-02 RX ADMIN — HYDROMORPHONE HYDROCHLORIDE 2 MG: 2 INJECTION, SOLUTION INTRAMUSCULAR; INTRAVENOUS; SUBCUTANEOUS at 13:49

## 2017-06-02 RX ADMIN — GENTAMICIN SULFATE 450 MG: 40 INJECTION, SOLUTION INTRAMUSCULAR; INTRAVENOUS at 15:08

## 2017-06-02 RX ADMIN — Medication 10 ML: at 21:11

## 2017-06-02 RX ADMIN — SODIUM CHLORIDE, SODIUM LACTATE, POTASSIUM CHLORIDE, CALCIUM CHLORIDE, AND DEXTROSE MONOHYDRATE 125 ML/HR: 600; 310; 30; 20; 5 INJECTION, SOLUTION INTRAVENOUS at 13:49

## 2017-06-02 RX ADMIN — CEFAZOLIN SODIUM 2 G: 2 SOLUTION INTRAVENOUS at 13:49

## 2017-06-02 NOTE — IP AVS SNAPSHOT
[address] Patient: Lai Finder MRN: [id number] You are allergic to the following Allergen Reactions Prednisone Swelling Hives Other reaction(s): neurological reaction Rash & headaches Phenergan (Promethazine) Other (comments) HALLUCINATIONS 
hallucinations Tramadol Other (comments) Nausea Only Patient complains of headaches. Recent Documentation Smoking Status Never Smoker Unresulted Labs Order Current Status CULTURE, BLOOD Preliminary result CULTURE, BLOOD Preliminary result Emergency Contacts Name Discharge Info Relation Home Work Mobile Christine Kline DISCHARGE CAREGIVER [3] Spouse [3] [phone] About your hospitalization You were admitted on:  June 2, 2017 You last received care in the:  [facility] [address] 1982 You were discharged on:  June 4, 2017 Unit phone number:  [phone] Why you were hospitalized Your primary diagnosis was:  Not on File Your diagnoses also included:  Epididymitis, Bilateral  
  
  
 
  
  
Providers Seen During Your Hospitalizations Provider Role Specialty Primary office phone Anjelica Rockwell MD Attending Provider Urology [phone] Your Primary Care Physician (PCP) Primary Care Physician Office Phone Office Fax Yelena Carroll [phone] Follow-up Information Follow up With Details Comments Contact Info Mavis Sauer MD On 6/8/2017 Appointment at 2:30pm [address] [address] 
[phone] Anjelica Rockwell MD On 7/6/2017 Appointment at 10:00am [address] [place] [address] [address] [address] 
[phone] Your Appointments  Thursday June 08, 2017  2:30 PM EDT  
 TRANSITIONAL CARE MANAGEMENT with Juli Alegria MD  
[address] [facility] ([address]) [facility] [address] [facility] [address]  
[phone] Thursday July 06, 2017 10:00 AM EDT Office Visit with Amelia Mendez MD  
[facility] [address] [place] [address]) [facility] [address] [address]  
[phone] Current Discharge Medication List  
  
START taking these medications Dose & Instructions Dispensing Information Comments Morning Noon Evening Bedtime  
 cephALEXin 500 mg capsule Commonly known as:  Darletta Willingham Your last dose was: Your next dose is:    
   
   
 Dose:  500 mg Take 1 Cap by mouth two (2) times a day for 7 days. Indications: BACTERIAL URINARY TRACT INFECTION Quantity:  14 Cap Refills:  0  
     
   
   
   
  
 ketorolac 10 mg tablet Commonly known as:  TORADOL Replaces:  ketorolac 30 mg/mL (1 mL) injection Your last dose was: Your next dose is:    
   
   
 Dose:  10 mg Take 1 Tab by mouth every six (6) hours as needed for up to 20 doses. Quantity:  20 Tab Refills:  0 CONTINUE these medications which have NOT CHANGED Dose & Instructions Dispensing Information Comments Morning Noon Evening Bedtime ADDERALL 30 mg tablet Generic drug:  dextroamphetamine-amphetamine Your last dose was: Your next dose is:    
   
   
 Dose:  30 mg Take 30 mg by mouth three (3) times daily. Refills:  0 SUMAtriptan 50 mg tablet Commonly known as:  IMITREX Your last dose was: Your next dose is:    
   
   
 1 tab at onset ok to use second tablet if headache not resolved in 2 hours. Max of 2 daily Quantity:  12 Tab Refills:  1 TRAZODONE PO Your last dose was: Your next dose is: Take  by mouth. Refills:  0  
     
   
   
   
  
 verapamil 40 mg tablet Commonly known as:  CALAN Your last dose was: Your next dose is:    
   
   
  Refills:  0  
     
   
   
   
  
 XANAX 0.5 mg tablet Generic drug:  ALPRAZolam  
   
Your last dose was: Your next dose is: Take  by mouth. Refills:  0 STOP taking these medications   
 ciprofloxacin HCl 500 mg tablet Commonly known as:  CIPRO  
   
  
 gentamicin 40 mg/mL injection Commonly known as:  GARAMYCIN  
   
  
 HYDROmorphone 4 mg tablet Commonly known as:  DILAUDID  
   
  
 ketorolac 30 mg/mL (1 mL) injection Commonly known as:  TORADOL Replaced by:  ketorolac 10 mg tablet  
   
  
 methylPREDNISolone 4 mg tablet Commonly known as:  MEDROL DOSEPACK  
   
  
 naproxen 500 mg tablet Commonly known as:  NAPROSYN  
   
  
 ondansetron 4 mg disintegrating tablet Commonly known as:  ZOFRAN ODT Where to Get Your Medications Information on where to get these meds will be given to you by the nurse or doctor. ! Ask your nurse or doctor about these medications  
  cephALEXin 500 mg capsule  
 ketorolac 10 mg tablet Discharge Instructions DISCHARGE SUMMARY from Nurse The following personal items are in your possession at time of discharge: 
 
Dental Appliances: None Visual Aid: Glasses Home Medications: None Jewelry: None Clothing: At bedside Other Valuables: Cell Phone, Wallet, Other (comment) (tablet) PATIENT INSTRUCTIONS: 
 
 
F-face looks uneven A-arms unable to move or move unevenly S-speech slurred or non-existent T-time-call 911 as soon as signs and symptoms begin-DO NOT go Back to bed or wait to see if you get better-TIME IS BRAIN.  
 
Warning Signs of HEART ATTACK  
 
 Call 911 if you have these symptoms: 
? Chest discomfort. Most heart attacks involve discomfort in the center of the chest that lasts more than a few minutes, or that goes away and comes back. It can feel like uncomfortable pressure, squeezing, fullness, or pain. ? Discomfort in other areas of the upper body. Symptoms can include pain or discomfort in one or both arms, the back, neck, jaw, or stomach. ? Shortness of breath with or without chest discomfort. ? Other signs may include breaking out in a cold sweat, nausea, or lightheadedness. Don't wait more than five minutes to call [address]! Fast action can save your life. Calling 911 is almost always the fastest way to get lifesaving treatment. Emergency Medical Services staff can begin treatment when they arrive  up to an hour sooner than if someone gets to the hospital by car. The discharge information has been reviewed with the patient. The patient verbalized understanding. Discharge medications reviewed with the patient and appropriate educational materials and side effects teaching were provided. Meet.com Activation Thank you for requesting access to Meet.com. Please follow the instructions below to securely access and download your online medical record. Meet.com allows you to send messages to your doctor, view your test results, renew your prescriptions, schedule appointments, and more. How Do I Sign Up? 1. In your internet browser, go to [email or web address] 
2. Click on the First Time User? Click Here link in the Sign In box. You will be redirect to the New Member Sign Up page. 3. Enter your Meet.com Access Code exactly as it appears below. You will not need to use this code after youve completed the sign-up process. If you do not sign up before the expiration date, you must request a new code. Meet.com Access Code: [facility] [place] Expires: 2017  9:38 AM (This is the date your Meet.com access code will ) 4. Enter the last four digits of your Social Security Number (xxxx) and Date of Birth (mm/dd/yyyy) as indicated and click Submit. You will be taken to the next sign-up page. 5. Create a [employer] ID. This will be your [employer] login ID and cannot be changed, so think of one that is secure and easy to remember. 6. Create a [employer] password. You can change your password at any time. 7. Enter your Password Reset Question and Answer. This can be used at a later time if you forget your password. 8. Enter your e-mail address. You will receive e-mail notification when new information is available in [address] 9. Click Sign Up. You can now view and download portions of your medical record. 10. Click the Download Summary menu link to download a portable copy of your medical information. Additional Information If you have questions, please visit the Frequently Asked Questions section of the [employer] website at [email or web address] Remember, [employer] is NOT to be used for urgent needs. For medical emergencies, dial 911. Patient armband removed and shredded Epididymitis and Orchitis: Care Instructions Your Care Instructions Epididymitis is pain and swelling of the tube that is attached to each testicle. This tube is called the epididymis. Orchitis is pain and swelling of the testicle. Infection with bacteria often causes these conditions. Sexually transmitted infections (STIs) also can cause both conditions. This is often the case in men younger than 28. Other causes in boys and older men are infections from surgery or having a catheter that drains urine. The mumps virus also can cause orchitis. Anti-inflammatory or pain medicines can help with the pain. Antibiotics are used if the problem is caused by bacteria. They are not used if a virus is the cause. Your testicle may stay swollen for many days or even a few weeks. The doctor has checked you carefully, but problems can develop later. If you notice any problems or new symptoms, get medical treatment right away. Follow-up care is a key part of your treatment and safety. Be sure to make and go to all appointments, and call your doctor if you are having problems. It's also a good idea to know your test results and keep a list of the medicines you take. How can you care for yourself at home? · If your doctor prescribed antibiotics, take them as directed. Do not stop taking them just because you feel better. You need to take the full course of antibiotics. · Ask your doctor if you can take an over-the-counter pain medicine, such as acetaminophen (Tylenol), ibuprofen (Advil, Motrin), or naproxen (Aleve). Be safe with medicines. Read and follow all instructions on the label. · Limit your activity to what is comfortable. · Wear snug underwear or an athletic supporter. This can help reduce pain. · Apply either cold or heat to the swollen area. Use the one that works best for your pain. Sitting in a warm bath for 15 minutes twice a day will help reduce the swelling more quickly. · If you have been told that an STI may have caused your condition, do not have sex until your doctor says it is safe. This will prevent spreading the infection. Tell your sex partner or partners that they need to be checked. They may need treatment. When should you call for help? Call your doctor now or seek immediate medical care if: 
· Your pain gets worse. · You have a new or higher fever. · You have new or more swelling of your testicle. · You have new belly pain, or your pain gets worse. Watch closely for changes in your health, and be sure to contact your doctor if: 
· You do not get better as expected. Where can you learn more? Go to http://alize-maryann.info/. Enter X429 in the search box to learn more about \"Epididymitis and Orchitis: Care Instructions. \" 
 Current as of: August 12, 2016 Content Version: 11.2 © 6446-1156 [employer]andtalent, [employer]. Care instructions adapted under license by [employer] (which disclaims liability or warranty for this information). If you have questions about a medical condition or this instruction, always ask your healthcare professional. [address] any warranty or liability for your use of this information. Discharge Orders None [employer] Announcement We are excited to announce that we are making your provider's discharge notes available to you in TiVUS. You will see these notes when they are completed and signed by the physician that discharged you from your recent hospital stay. If you have any questions or concerns about any information you see in [employer], please call the Health Information Department where you were seen or reach out to your Primary Care Provider for more information about your plan of care. Introducing \[facility]\"" & HEALTH SERVICES! [facility] introduces TiVUS patient portal. Now you can access parts of your medical record, email your doctor's office, and request medication refills online. 1. In your internet browser, go to [email or web address] 2. Click on the First Time User? Click Here link in the Sign In box. You will see the New Member Sign Up page. 3. Enter your TiVUS Access Code exactly as it appears below. You will not need to use this code after youve completed the sign-up process. If you do not sign up before the expiration date, you must request a new code. · TiVUS Access Code: [facility] Expires: 8/21/2017  9:38 AM 
 
4. Enter the last four digits of your Social Security Number (xxxx) and Date of Birth (mm/dd/yyyy) as indicated and click Submit. You will be taken to the next sign-up page. 5. Create a TiVUS ID.  This will be your TiVUS login ID and cannot be changed, so think of one that is secure and easy to remember. 6. Create a [employer] password. You can change your password at any time. 7. Enter your Password Reset Question and Answer. This can be used at a later time if you forget your password. 8. Enter your e-mail address. You will receive e-mail notification when new information is available in [address] 9. Click Sign Up. You can now view and download portions of your medical record. 10. Click the Download Summary menu link to download a portable copy of your medical information. If you have questions, please visit the Frequently Asked Questions section of the [employer] website. Remember, [employer] is NOT to be used for urgent needs. For medical emergencies, dial 911. Now available from your iPhone and Android! General Information Please provide this summary of care documentation to your next provider. Patient Signature:  ____________________________________________________________ Date:  ____________________________________________________________  
  
Mely Cons Provider Signature:  ____________________________________________________________ Date:  ____________________________________________________________

## 2017-06-02 NOTE — IP AVS SNAPSHOT
Current Discharge Medication List  
  
START taking these medications Dose & Instructions Dispensing Information Comments Morning Noon Evening Bedtime  
 cephALEXin 500 mg capsule Commonly known as:  Fabian Oakes Your last dose was: Your next dose is:    
   
   
 Dose:  500 mg Take 1 Cap by mouth two (2) times a day for 7 days. Indications: BACTERIAL URINARY TRACT INFECTION Quantity:  14 Cap Refills:  0  
     
   
   
   
  
 ketorolac 10 mg tablet Commonly known as:  TORADOL Replaces:  ketorolac 30 mg/mL (1 mL) injection Your last dose was: Your next dose is:    
   
   
 Dose:  10 mg Take 1 Tab by mouth every six (6) hours as needed for up to 20 doses. Quantity:  20 Tab Refills:  0 CONTINUE these medications which have NOT CHANGED Dose & Instructions Dispensing Information Comments Morning Noon Evening Bedtime ADDERALL 30 mg tablet Generic drug:  dextroamphetamine-amphetamine Your last dose was: Your next dose is:    
   
   
 Dose:  30 mg Take 30 mg by mouth three (3) times daily. Refills:  0 SUMAtriptan 50 mg tablet Commonly known as:  IMITREX Your last dose was: Your next dose is:    
   
   
 1 tab at onset ok to use second tablet if headache not resolved in 2 hours. Max of 2 daily Quantity:  12 Tab Refills:  1 TRAZODONE PO Your last dose was: Your next dose is: Take  by mouth. Refills:  0  
     
   
   
   
  
 verapamil 40 mg tablet Commonly known as:  CALAN Your last dose was: Your next dose is:    
   
   
  Refills:  0  
     
   
   
   
  
 XANAX 0.5 mg tablet Generic drug:  ALPRAZolam  
   
Your last dose was: Your next dose is: Take  by mouth. Refills:  0 STOP taking these medications ciprofloxacin HCl 500 mg tablet Commonly known as:  CIPRO  
   
  
 gentamicin 40 mg/mL injection Commonly known as:  GARAMYCIN  
   
  
 HYDROmorphone 4 mg tablet Commonly known as:  DILAUDID  
   
  
 ketorolac 30 mg/mL (1 mL) injection Commonly known as:  TORADOL Replaced by:  ketorolac 10 mg tablet  
   
  
 methylPREDNISolone 4 mg tablet Commonly known as:  MEDROL DOSEPACK  
   
  
 naproxen 500 mg tablet Commonly known as:  NAPROSYN  
   
  
 ondansetron 4 mg disintegrating tablet Commonly known as:  ZOFRAN ODT Where to Get Your Medications Information on where to get these meds will be given to you by the nurse or doctor. ! Ask your nurse or doctor about these medications  
  cephALEXin 500 mg capsule  
 ketorolac 10 mg tablet

## 2017-06-02 NOTE — PROGRESS NOTES
A&Ox4, Patient crying  due to excruciating pain in testicles. Testes are red and swollen   Nausea/Vomiting-neg  SOB/Chest pain- neg  Lungs-clear  Bowels-active  Pulses-palpable and present  Dressing-none present  Ambulatory- self ambulatory  Pain 10/10, awaiting oders from Physician  Skin is intact, pt is on room air.  Dizziness and hot flashes reported

## 2017-06-02 NOTE — PROGRESS NOTES
Patient is seen and examined again this evening. His scrotal ultrasound is again reviewed and really shows no changes indicative of any type of inflammatory or hypovascular or avascular events. In summary there is no sign of any acute epididymitis or orchitis on any of these images. His CT scan performed earlier again while limited by the hip prostheses and scatter from that showed no intra-abdominal changes. His blood work today shows a normal white count and since his admission he has been afebrile. A digital rectal examination this afternoon shows normal sphincter tone with no evidence of any perirectal abscess or inflammatory process and virtually no tenderness. His prostate is about 30 g in size and not boggy or tender at all. I do not feel any perirectal masses or tenderness indicative of any type of perirectal abscess. At this point in time, I do not have any clear-cut diagnosis or explanation for his pain. I plan to continue his IV antibiotics per for probably another 48 hours. I may repeat his abdominal studies in the morning but I may just repeat an abdominal ultrasound looking for pelvic inflammatory changes which were not seen on the preceding CT scan. If I am still unable to give any kind of diagnosis for his pain which is out of proportion to the physical and imaging studies and examination I may ask 1 of the hospitalist to check for anything that I may have overlooked.

## 2017-06-02 NOTE — PROGRESS NOTES
conducted an initial consultation and Spiritual Assessment for David Arenas, who is a 42 y.o.,male. Patients Primary Language is: [place]. According to the patients EMR Islam Affiliation is: No Uatsdin. The reason the Patient came to the hospital is:   Patient Active Problem List    Diagnosis Date Noted    Epididymitis, bilateral 06/02/2017    Patellofemoral chondrosis of right knee 08/16/2016    History of total hip replacement 08/16/2016    Right hip pain 08/16/2016    H/O bilateral hip replacements 07/06/2016        The  provided the following Interventions:  Initiated a relationship of care and support. Explored issues of sabrina, belief, spirituality and Hindu/ritual needs while hospitalized. Listened empathically. Provided information about Spiritual Care Services. Offered prayer and assurance of continued prayers on patient's behalf. Chart reviewed. The following outcomes where achieved:  Patient shared limited information about both their medical narrative and spiritual journey/beliefs.  confirmed Patient's Islam Affiliation. Spouse expressed gratitude for 's visit. Assessment:   Patient is having physical pain that is being taking care of by his nurse. Patient does not have any Hindu/cultural needs that will affect patients preferences in health care. Plan:  Chaplains will continue to follow and will provide pastoral care on an as needed/requested basis.  recommends bedside caregivers page  on duty if patient shows signs of acute spiritual or emotional distress.     [address]  [phone])

## 2017-06-02 NOTE — PROGRESS NOTES
Chief Complaint   Patient presents with    Epididymitis    Testicle Pain     10/10       HISTORY OF PRESENT ILLNESS:  David Arenas is a 42 y.o. male who presents today in follow-up to an evaluation yesterday. He had been evaluated earlier at [facility] with CT scan scrotal ultrasound trying to documented torsion or an abscess of the testicles because of pain but over the past week and particularly over the past 72 hours or so his pain has become almost unbearable. He was in our office yesterday with severe pain and on examination he did have quite severe pain in the thickened epididymis consistent with epididymoorchitis but the pain is almost out of proportion to the physical findings. I did give him 160 mg of gentamicin yesterday but his pain has not greatly improved and because of the intractable nature of this, I am going to admit him for IV fluids, IV antibiotics and pain medication and management. I am also going to repeat his scrotal ultrasound with Doppler studies and any further studies that would be necessary. ROS 's past history and review of systems are all documented on the chart, refer to those complete details.     Past Medical History:   Diagnosis Date    ADHD (attention deficit hyperactivity disorder)     GERD (gastroesophageal reflux disease)     Hematuria     Hypertension     Pancreatitis     Panic attack        Past Surgical History:   Procedure Laterality Date    EXCISE VARICOCELE      HX APPENDECTOMY      HX HERNIA REPAIR      double hernia    HX HIP REPLACEMENT Right 2014    HX HIP REPLACEMENT Left 2013       Social History   Substance Use Topics    Smoking status: Never Smoker    Smokeless tobacco: Never Used    Alcohol use No      Comment: weekend       Allergies   Allergen Reactions    Prednisone Swelling and Hives     Other reaction(s): neurological reaction  Rash & headaches    Phenergan [Promethazine] Other (comments)     HALLUCINATIONS  hallucinations  Tramadol Other (comments) and Nausea Only     Patient complains of headaches. Family History   Problem Relation Age of Onset    Thyroid Disease Mother     Hypertension Father     Heart Attack Father     Cancer Sister      lung    Ovarian Cancer Sister     Alcohol abuse Brother        Current Outpatient Prescriptions   Medication Sig Dispense Refill    verapamil (CALAN) 40 mg tablet   0    HYDROmorphone (DILAUDID) 4 mg tablet Take 1 Tab by mouth every four (4) hours as needed for Pain. Max Daily Amount: 24 mg. 10 Tab 0    naproxen (NAPROSYN) 500 mg tablet Take 1 Tab by mouth two (2) times daily (with meals). 20 Tab 0    SUMAtriptan (IMITREX) 50 mg tablet 1 tab at onset ok to use second tablet if headache not resolved in 2 hours. Max of 2 daily 12 Tab 1    ALPRAZolam (XANAX) 0.5 mg tablet Take  by mouth.  TRAZODONE HCL (TRAZODONE PO) Take  by mouth.  dextroamphetamine-amphetamine (ADDERALL) 30 mg tablet Take 30 mg by mouth three (3) times daily.  ciprofloxacin HCl (CIPRO) 500 mg tablet Take 1 Tab by mouth two (2) times a day for 10 days. 20 Tab 0    ondansetron (ZOFRAN ODT) 4 mg disintegrating tablet Take 1 Tab by mouth every eight (8) hours as needed for Nausea. 8 Tab 0    methylPREDNISolone (MEDROL DOSEPACK) 4 mg tablet Use as directed on pack. 1 Dose Pack 0         PHYSICAL EXAMINATION:   Visit Vitals    /87 (BP 1 Location: Left arm, BP Patient Position: Sitting)    Pulse (!) 108    Ht 6' (1.829 m)    Wt 214 lb (97.1 kg)    SpO2 98%    BMI 29.02 kg/m2     Constitutional: WDWN, Pleasant and appropriate affect, very miserable and in acute pain. CV:  No peripheral swelling noted  Respiratory: No respiratory distress or difficulties  Abdomen:  No abdominal masses or tenderness. No CVA tenderness. No inguinal hernias noted.  Male:    BRUNA: Deferred today. SCROTUM:  No scrotal rash or lesions noticed.   He has some edematous thickened scrotal skin but the testicles are extremely tender particularly on the left but both of them are involved that way. Migdalia Wolf PENIS: Urethral meatus normal in location and size. No urethral discharge. Skin: No jaundice. Neuro/Psych:  Alert and oriented x 3, affect appropriate. He is in extreme pain  Lymphatic:   No enlarged inguinal lymph nodes. Results for orders placed or performed in visit on 06/01/17   AMB POC URINALYSIS DIP STICK AUTO W/O MICRO   Result Value Ref Range    Color (UA POC) Yellow     Clarity (UA POC) Clear     Glucose (UA POC) Negative Negative    Bilirubin (UA POC) Negative Negative    Ketones (UA POC) Negative Negative    Specific gravity (UA POC) 1.015 1.001 - 1.035    Blood (UA POC) Negative Negative    pH (UA POC) 6.5 4.6 - 8.0    Protein (UA POC) Negative Negative mg/dL    Urobilinogen (UA POC) 0.2 mg/dL 0.2 - 1    Nitrites (UA POC) Negative Negative    Leukocyte esterase (UA POC) Negative Negative         REVIEW OF LABS AND IMAGING:     Imaging Report Reviewed? YES     Images Reviewed? YES           Other Lab Data Reviewed? YES         ASSESSMENT:     ICD-10-CM ICD-9-CM    1. Epididymitis, bilateral N45.1 604.90    2. Intractable pain R52 780.96             PLAN / DISCUSSION: : I am going to admit him for IV antibiotics, reevaluation of his scrotal and testicular pain, and management of of his pain. The patient expresses understanding and agreement of the discussion and plan. Sergey Morales MD on 6/2/2017         Please note: This document has been produced using voice recognition software. Unrecognized errors in transcription may be present.

## 2017-06-02 NOTE — MR AVS SNAPSHOT
Visit Information Date & Time Provider Department Dept. Phone Encounter #  
 6/2/2017 10:15 AM Amelia Mendez, [address] [facility] [phone] [id number] Upcoming Health Maintenance Date Due DTaP/Tdap/Td series (1 - Tdap) 3/9/1999 INFLUENZA AGE 9 TO ADULT 8/1/2017 Allergies as of 6/2/2017  Review Complete On: 6/2/2017 By: Amelia Mendez MD  
  
 Severity Noted Reaction Type Reactions Prednisone Medium 10/24/2012    Swelling, Hives Other reaction(s): neurological reaction Rash & headaches Phenergan [Promethazine]  05/23/2011    Other (comments) HALLUCINATIONS 
hallucinations Tramadol  10/24/2012    Other (comments), Nausea Only Patient complains of headaches. Current Immunizations  Never Reviewed Name Date Influenza Vaccine PF 10/19/2015 Not reviewed this visit You Were Diagnosed With   
  
 Codes Comments Epididymitis, bilateral    -  Primary ICD-10-CM: N45.1 ICD-9-CM: 604.90 Intractable pain     ICD-10-CM: M06 ICD-9-CM: 780.96 Vitals BP Pulse Height(growth percentile) Weight(growth percentile) SpO2 BMI  
 149/87 (BP 1 Location: Left arm, BP Patient Position: Sitting) (!) 108 6' (1.829 m) 214 lb (97.1 kg) 98% 29.02 kg/m2 Smoking Status Never Smoker BMI and BSA Data Body Mass Index Body Surface Area  
 29.02 kg/m 2 2.22 m 2 Preferred Pharmacy Pharmacy Name Phone [address], [address] [place] [address] [phone] Your Updated Medication List  
  
   
This list is accurate as of: 6/2/17 11:09 AM.  Always use your most recent med list.  
  
  
  
  
 ADDERALL 30 mg tablet Generic drug:  dextroamphetamine-amphetamine Take 30 mg by mouth three (3) times daily. ciprofloxacin HCl 500 mg tablet Commonly known as:  CIPRO Take 1 Tab by mouth two (2) times a day for 10 days. HYDROmorphone 4 mg tablet Commonly known as:  DILAUDID Take 1 Tab by mouth every four (4) hours as needed for Pain. Max Daily Amount: 24 mg.  
  
 methylPREDNISolone 4 mg tablet Commonly known as:  Tilman Blew Use as directed on pack. naproxen 500 mg tablet Commonly known as:  NAPROSYN Take 1 Tab by mouth two (2) times daily (with meals). ondansetron 4 mg disintegrating tablet Commonly known as:  ZOFRAN ODT Take 1 Tab by mouth every eight (8) hours as needed for Nausea. SUMAtriptan 50 mg tablet Commonly known as:  IMITREX  
1 tab at onset ok to use second tablet if headache not resolved in 2 hours. Max of 2 daily TRAZODONE PO Take  by mouth.  
  
 verapamil 40 mg tablet Commonly known as:  CALAN  
  
 XANAX 0.5 mg tablet Generic drug:  ALPRAZolam  
Take  by mouth. Introducing [facility]! Toribio Nyhan introduces [employer] patient portal. Now you can access parts of your medical record, email your doctor's office, and request medication refills online. 1. In your internet browser, go to [email or web address] 2. Click on the First Time User? Click Here link in the Sign In box. You will see the New Member Sign Up page. 3. Enter your [employer] Access Code exactly as it appears below. You will not need to use this code after youve completed the sign-up process. If you do not sign up before the expiration date, you must request a new code. · [employer] Access Code: [facility] Expires: 8/21/2017  9:38 AM 
 
4. Enter the last four digits of your Social Security Number (xxxx) and Date of Birth (mm/dd/yyyy) as indicated and click Submit. You will be taken to the next sign-up page. 5. Create a [employer] ID. This will be your [employer] login ID and cannot be changed, so think of one that is secure and easy to remember. 6. Create a [employer] password. You can change your password at any time. 7. Enter your Password Reset Question and Answer.  This can be used at a later time if you forget your password. 8. Enter your e-mail address. You will receive e-mail notification when new information is available in [address] 9. Click Sign Up. You can now view and download portions of your medical record. 10. Click the Download Summary menu link to download a portable copy of your medical information. If you have questions, please visit the Frequently Asked Questions section of the [employer] website. Remember, [employer] is NOT to be used for urgent needs. For medical emergencies, dial 911. Now available from your iPhone and Android! Please provide this summary of care documentation to your next provider. Your primary care clinician is listed as Kyara Enamorado. If you have any questions after today's visit, please call [phone].

## 2017-06-02 NOTE — PROGRESS NOTES
Bedside shift change report given to Evonne Johnston (oncoming nurse) by Mare Jones (offgoing nurse). Report included the following information SBAR, Kardex, Procedure Summary, Intake/Output, MAR, Recent Results and Med Rec Status.

## 2017-06-02 NOTE — H&P
Ulises Mckeon MD   Urology    Epididymitis, bilateral +1 more   Dx    Epididymitis, Testicle Pain    Reason for visit    Progress Notes   Unsigned                  Chief Complaint   Patient presents with    Epididymitis    Testicle Pain       10/10         HISTORY OF PRESENT ILLNESS: Jacobo Jane is a 42 y.o. male who presents today in follow-up to an evaluation yesterday. He had been evaluated earlier at [facility] with CT scan scrotal ultrasound trying to documented torsion or an abscess of the testicles because of pain but over the past week and particularly over the past 72 hours or so his pain has become almost unbearable. He was in our office yesterday with severe pain and on examination he did have quite severe pain in the thickened epididymis consistent with epididymoorchitis but the pain is almost out of proportion to the physical findings. I did give him 160 mg of gentamicin yesterday but his pain has not greatly improved and because of the intractable nature of this, I am going to admit him for IV fluids, IV antibiotics and pain medication and management.  I am also going to repeat his scrotal ultrasound with Doppler studies and any further studies that would be necessary.               ROS 's past history and review of systems are all documented on the chart, refer to those complete details.          Past Medical History:   Diagnosis Date    ADHD (attention deficit hyperactivity disorder)      GERD (gastroesophageal reflux disease)      Hematuria      Hypertension      Pancreatitis      Panic attack           Past Surgical History:   Procedure Laterality Date    EXCISE VARICOCELE        HX APPENDECTOMY        HX HERNIA REPAIR         double hernia    HX HIP REPLACEMENT Right 2014    HX HIP REPLACEMENT Left 2013                Social History   Substance Use Topics    Smoking status: Never Smoker    Smokeless tobacco: Never Used    Alcohol use No         Comment: weekend        Allergies   Allergen Reactions    Prednisone Swelling and Hives       Other reaction(s): neurological reaction  Rash & headaches    Phenergan [Promethazine] Other (comments)       HALLUCINATIONS  hallucinations    Tramadol Other (comments) and Nausea Only       Patient complains of headaches.                Family History   Problem Relation Age of Onset    Thyroid Disease Mother      Hypertension Father      Heart Attack Father      Cancer Sister         lung    Ovarian Cancer Sister      Alcohol abuse Brother                  Current Outpatient Prescriptions   Medication Sig Dispense Refill    verapamil (CALAN) 40 mg tablet     0    HYDROmorphone (DILAUDID) 4 mg tablet Take 1 Tab by mouth every four (4) hours as needed for Pain. Max Daily Amount: 24 mg. 10 Tab 0    naproxen (NAPROSYN) 500 mg tablet Take 1 Tab by mouth two (2) times daily (with meals). 20 Tab 0    SUMAtriptan (IMITREX) 50 mg tablet 1 tab at onset ok to use second tablet if headache not resolved in 2 hours. Max of 2 daily 12 Tab 1    ALPRAZolam (XANAX) 0.5 mg tablet Take by mouth.        TRAZODONE HCL (TRAZODONE PO) Take by mouth.        dextroamphetamine-amphetamine (ADDERALL) 30 mg tablet Take 30 mg by mouth three (3) times daily.        ciprofloxacin HCl (CIPRO) 500 mg tablet Take 1 Tab by mouth two (2) times a day for 10 days. 20 Tab 0    ondansetron (ZOFRAN ODT) 4 mg disintegrating tablet Take 1 Tab by mouth every eight (8) hours as needed for Nausea. 8 Tab 0    methylPREDNISolone (MEDROL DOSEPACK) 4 mg tablet Use as directed on pack. 1 Dose Pack 0            PHYSICAL EXAMINATION:        Visit Vitals    /87 (BP 1 Location: Left arm, BP Patient Position: Sitting)    Pulse (!) 108    Ht 6' (1.829 m)    Wt 214 lb (97.1 kg)    SpO2 98%    BMI 29.02 kg/m2      Constitutional: WDWN, Pleasant and appropriate affect, very miserable and in acute pain.   CV: No peripheral swelling noted  Respiratory: No respiratory distress or difficulties  Abdomen: No abdominal masses or tenderness. No CVA tenderness. No inguinal hernias noted.  Male:   BRUNA: Deferred today. SCROTUM: No scrotal rash or lesions noticed. He has some edematous thickened scrotal skin but the testicles are extremely tender particularly on the left but both of them are involved that way. Mauro Dantanvir PENIS: Urethral meatus normal in location and size. No urethral discharge. Skin: No jaundice. Neuro/Psych: Alert and oriented x 3, affect appropriate. He is in extreme pain  Lymphatic: No enlarged inguinal lymph nodes.                Results for orders placed or performed in visit on 06/01/17   AMB POC URINALYSIS DIP STICK AUTO W/O MICRO   Result Value Ref Range     Color (UA POC) Yellow       Clarity (UA POC) Clear       Glucose (UA POC) Negative Negative     Bilirubin (UA POC) Negative Negative     Ketones (UA POC) Negative Negative     Specific gravity (UA POC) 1.015 1.001 - 1.035     Blood (UA POC) Negative Negative     pH (UA POC) 6.5 4.6 - 8.0     Protein (UA POC) Negative Negative mg/dL     Urobilinogen (UA POC) 0.2 mg/dL 0.2 - 1     Nitrites (UA POC) Negative Negative     Leukocyte esterase (UA POC) Negative Negative            REVIEW OF LABS AND IMAGING:      Imaging Report Reviewed? YES   Images Reviewed? YES      Other Lab Data Reviewed? YES            ASSESSMENT:       ICD-10-CM ICD-9-CM     1. Epididymitis, bilateral N45.1 604.90     2. Intractable pain R52 780.96                PLAN / DISCUSSION: : I am going to admit him for IV antibiotics, reevaluation of his scrotal and testicular pain, and management of of his pain.        The patient expresses understanding and agreement of the discussion and plan.  Froylan Damon MD on 6/2/2017            Please note:     This document has been produced using voice recognition software.  Unrecognized errors in transcription may be present.

## 2017-06-03 ENCOUNTER — APPOINTMENT (OUTPATIENT)
Dept: ULTRASOUND IMAGING | Age: 37
DRG: 728 | End: 2017-06-03
Attending: UROLOGY
Payer: COMMERCIAL

## 2017-06-03 LAB
ANION GAP BLD CALC-SCNC: 5 MMOL/L (ref 3–18)
BACTERIA SPEC CULT: NORMAL
BUN SERPL-MCNC: 19 MG/DL (ref 7–18)
BUN/CREAT SERPL: 22 (ref 12–20)
CALCIUM SERPL-MCNC: 8.6 MG/DL (ref 8.5–10.1)
CHLORIDE SERPL-SCNC: 100 MMOL/L (ref 100–108)
CO2 SERPL-SCNC: 30 MMOL/L (ref 21–32)
CREAT SERPL-MCNC: 0.88 MG/DL (ref 0.6–1.3)
GENTAMICIN SERPL-MCNC: 1.7 UG/ML (ref 0.5–10)
GLUCOSE SERPL-MCNC: 126 MG/DL (ref 74–99)
POTASSIUM SERPL-SCNC: 3.7 MMOL/L (ref 3.5–5.5)
SERVICE CMNT-IMP: NORMAL
SODIUM SERPL-SCNC: 135 MMOL/L (ref 136–145)

## 2017-06-03 PROCEDURE — 74011250637 HC RX REV CODE- 250/637: Performed by: UROLOGY

## 2017-06-03 PROCEDURE — 87086 URINE CULTURE/COLONY COUNT: CPT | Performed by: UROLOGY

## 2017-06-03 PROCEDURE — 74011258636 HC RX REV CODE- 258/636: Performed by: UROLOGY

## 2017-06-03 PROCEDURE — 74011000258 HC RX REV CODE- 258: Performed by: UROLOGY

## 2017-06-03 PROCEDURE — 74011250636 HC RX REV CODE- 250/636: Performed by: UROLOGY

## 2017-06-03 PROCEDURE — 80048 BASIC METABOLIC PNL TOTAL CA: CPT | Performed by: UROLOGY

## 2017-06-03 PROCEDURE — 80170 ASSAY OF GENTAMICIN: CPT | Performed by: UROLOGY

## 2017-06-03 PROCEDURE — 76700 US EXAM ABDOM COMPLETE: CPT

## 2017-06-03 PROCEDURE — 36415 COLL VENOUS BLD VENIPUNCTURE: CPT | Performed by: UROLOGY

## 2017-06-03 PROCEDURE — 87641 MR-STAPH DNA AMP PROBE: CPT | Performed by: UROLOGY

## 2017-06-03 PROCEDURE — 65270000029 HC RM PRIVATE

## 2017-06-03 RX ORDER — ACETAMINOPHEN 325 MG/1
650 TABLET ORAL
Status: DISCONTINUED | OUTPATIENT
Start: 2017-06-03 | End: 2017-06-04 | Stop reason: HOSPADM

## 2017-06-03 RX ORDER — ALPRAZOLAM 1 MG/1
1 TABLET ORAL
Status: DISCONTINUED | OUTPATIENT
Start: 2017-06-03 | End: 2017-06-04 | Stop reason: HOSPADM

## 2017-06-03 RX ORDER — VERAPAMIL HYDROCHLORIDE 80 MG/1
40 TABLET ORAL 3 TIMES DAILY
Status: DISCONTINUED | OUTPATIENT
Start: 2017-06-03 | End: 2017-06-04 | Stop reason: HOSPADM

## 2017-06-03 RX ADMIN — HYDROMORPHONE HYDROCHLORIDE 2 MG: 2 INJECTION, SOLUTION INTRAMUSCULAR; INTRAVENOUS; SUBCUTANEOUS at 00:58

## 2017-06-03 RX ADMIN — HYDROMORPHONE HYDROCHLORIDE 2 MG: 2 INJECTION, SOLUTION INTRAMUSCULAR; INTRAVENOUS; SUBCUTANEOUS at 13:58

## 2017-06-03 RX ADMIN — HYDROMORPHONE HYDROCHLORIDE 2 MG: 2 INJECTION, SOLUTION INTRAMUSCULAR; INTRAVENOUS; SUBCUTANEOUS at 08:45

## 2017-06-03 RX ADMIN — CEFAZOLIN SODIUM 2 G: 2 SOLUTION INTRAVENOUS at 13:58

## 2017-06-03 RX ADMIN — ONDANSETRON 4 MG: 2 INJECTION INTRAMUSCULAR; INTRAVENOUS at 09:42

## 2017-06-03 RX ADMIN — GENTAMICIN SULFATE 450 MG: 40 INJECTION, SOLUTION INTRAMUSCULAR; INTRAVENOUS at 16:40

## 2017-06-03 RX ADMIN — VERAPAMIL HYDROCHLORIDE 40 MG: 80 TABLET, FILM COATED ORAL at 22:42

## 2017-06-03 RX ADMIN — Medication 10 ML: at 04:13

## 2017-06-03 RX ADMIN — HYDROMORPHONE HYDROCHLORIDE 2 MG: 2 INJECTION, SOLUTION INTRAMUSCULAR; INTRAVENOUS; SUBCUTANEOUS at 22:37

## 2017-06-03 RX ADMIN — HYDROMORPHONE HYDROCHLORIDE 2 MG: 2 INJECTION, SOLUTION INTRAMUSCULAR; INTRAVENOUS; SUBCUTANEOUS at 05:02

## 2017-06-03 RX ADMIN — VERAPAMIL HYDROCHLORIDE 40 MG: 80 TABLET, FILM COATED ORAL at 17:12

## 2017-06-03 RX ADMIN — SODIUM CHLORIDE, SODIUM LACTATE, POTASSIUM CHLORIDE, CALCIUM CHLORIDE, AND DEXTROSE MONOHYDRATE 125 ML/HR: 600; 310; 30; 20; 5 INJECTION, SOLUTION INTRAVENOUS at 08:51

## 2017-06-03 RX ADMIN — CEFAZOLIN SODIUM 2 G: 2 SOLUTION INTRAVENOUS at 05:07

## 2017-06-03 RX ADMIN — DEXTROAMPHETAMINE SACCHARATE, AMPHETAMINE ASPARTATE, DEXTROAMPHETAMINE SULFATE AND AMPHETAMINE SULFATE 30 MG: 5; 5; 5; 5 TABLET ORAL at 11:08

## 2017-06-03 RX ADMIN — DEXTROAMPHETAMINE SACCHARATE, AMPHETAMINE ASPARTATE, DEXTROAMPHETAMINE SULFATE AND AMPHETAMINE SULFATE 30 MG: 5; 5; 5; 5 TABLET ORAL at 17:12

## 2017-06-03 RX ADMIN — ACETAMINOPHEN 650 MG: 325 TABLET ORAL at 12:50

## 2017-06-03 RX ADMIN — HYDROMORPHONE HYDROCHLORIDE 2 MG: 2 INJECTION, SOLUTION INTRAMUSCULAR; INTRAVENOUS; SUBCUTANEOUS at 18:13

## 2017-06-03 RX ADMIN — Medication 10 ML: at 14:00

## 2017-06-03 RX ADMIN — ONDANSETRON 4 MG: 2 INJECTION INTRAMUSCULAR; INTRAVENOUS at 06:19

## 2017-06-03 RX ADMIN — SODIUM CHLORIDE, SODIUM LACTATE, POTASSIUM CHLORIDE, CALCIUM CHLORIDE, AND DEXTROSE MONOHYDRATE 125 ML/HR: 600; 310; 30; 20; 5 INJECTION, SOLUTION INTRAVENOUS at 20:46

## 2017-06-03 RX ADMIN — Medication 10 ML: at 05:12

## 2017-06-03 RX ADMIN — CEFAZOLIN SODIUM 2 G: 2 SOLUTION INTRAVENOUS at 22:40

## 2017-06-03 RX ADMIN — VERAPAMIL HYDROCHLORIDE 40 MG: 80 TABLET, FILM COATED ORAL at 11:08

## 2017-06-03 NOTE — PROGRESS NOTES
Bedside and Verbal shift change report given to Adrian Marquez RN (oncoming nurse) by Sandra Silva RN (offgoing nurse). Report included the following information SBAR, Kardex, MAR and Recent Results. SITUATION:    Code Status: Full Code   Reason for Admission: Epididymitis   Epididymitis, bilateral    [facility] day: 1   Problem List:       Hospital Problems  Date Reviewed: 6/2/2017          Codes Class Noted POA    Epididymitis, bilateral ICD-10-CM: N45.1  ICD-9-CM: 604.90  6/2/2017 Unknown              BACKGROUND:    Past Medical History:   Past Medical History:   Diagnosis Date    ADHD (attention deficit hyperactivity disorder)     GERD (gastroesophageal reflux disease)     Hematuria     Hypertension     Pancreatitis     Panic attack          Patient taking anticoagulants no     ASSESSMENT:    Changes in Assessment Throughout Shift: yes     Patient has Central Line: no Reasons if yes:    Patient has Ladd Cath: no Reasons if yes:       Last Vitals:     Vitals:    06/02/17 1959 06/03/17 0841 06/03/17 1103 06/03/17 1709   BP: 145/83 127/85 134/85 (!) 152/94   Pulse: 73 63 (!) 52 94   Resp: 19 16 17 18   Temp: 97.6 °F (36.4 °C) 97.7 °F (36.5 °C) 98 °F (36.7 °C) 98.5 °F (36.9 °C)   SpO2: 94% 98% 96% 99%        IV and DRAINS (will only show if present)   Peripheral IV 06/02/17 Left Hand-Site Assessment: Clean, dry, & intact     WOUND (if present)   Wound Type:  none   Dressing present Dressing Present : No   Wound Concerns/Notes:  none     PAIN    Pain Assessment    Pain Intensity 1: 7 (06/03/17 1812)    Pain Location 1: Scrotum    Pain Intervention(s) 1: Medication (see MAR)    Patient Stated Pain Goal: 0  o Interventions for Pain:  See MAR  o Intervention effective: yes  o Time of last intervention: 1813   o Reassessment Completed: yes      Last 3 Weights: There were no vitals filed for this visit.   Weight change:      INTAKE/OUPUT    Current Shift: 06/03 0701 - 06/03 1900  In: 0767 [P.O.:480; I.V.:2675]  Out: 1650 [Urine:1650]    Last three shifts: 06/01 1901 - 06/03 0700  In: -   Out: 850 [Urine:850]     LAB RESULTS     Recent Labs      06/02/17   1346   WBC  7.8   HGB  15.8   HCT  44.2   PLT  163        Recent Labs      06/03/17   0045  06/02/17   1346   NA  135*  139   K  3.7  3.7   GLU  126*  93   BUN  19*  21*   CREA  0.88  0.83   CA  8.6  9.7       RECOMMENDATIONS AND DISCHARGE PLANNING     1. Pending tests/procedures/ Plan of Care or Other Needs: pending results for US, pain control     2. Discharge plan for patient and Needs/Barriers: home    3. Estimated Discharge Date: 6/4/17 Posted on Whiteboard in [facility] Room: yes      4. The patient's care plan was reviewed with the oncoming nurse. \"HEALS\" SAFETY CHECK      Fall Risk    Total Score: 1    Safety Measures: Safety Measures: Bed/Chair-Wheels locked, Bed in low position, Gripper socks    A safety check occurred in the patient's room between off going nurse and oncoming nurse listed above. The safety check included the below items  Area Items   H  High Alert Medications - Verify all high alert medication drips (heparin, PCA, etc.)   E  Equipment - Suction is set up for ALL patients (with reese)  - Red plugs utilized for all equipment (IV pumps, etc.)  - WOWs wiped down at end of shift.  - Room stocked with oxygen, suction, and other unit-specific supplies   A  Alarms - Bed alarm is set for fall risk patients  - Ensure chair alarm is in place and activated if patient is up in a chair   L  Lines - Check IV for any infiltration  - Ladd bag is empty if patient has a Ladd   - Tubing and IV bags are labeled   S  Safety   - Room is clean, patient is clean, and equipment is clean. - Hallways are clear from equipment besides carts. - Fall bracelet on for fall risk patients  - Ensure room is clear and free of clutter  - Suction is set up for ALL patients (with reese)  - Hallways are clear from equipment besides carts.    - Isolation precautions followed, supplies available outside room, sign posted     Good Alcantara RN

## 2017-06-03 NOTE — PROGRESS NOTES
1000 Patient is alert and oriented x4. Denies any acute distress, SOB or chest pain. VSS. Palpable pedal pulses. Lungs are clear. Bowel sounds are active in all 4 quadrants. Passing flatus. Patient tolerating regular diet. Pt has periods of nausea. PRN Zofran given. Voiding without any difficulty. No neurological deficits noted. Bed is in low position, call light is within reach. Will continue to monitor. 1400 Pt walked 2 circles around the unit. Tolerated fairly. Returned to bed. Stable.

## 2017-06-03 NOTE — ROUTINE PROCESS
0145: Stable. Pain medication administered for severe  Pain. No nausea or vomiting. Encouraged to use Incentive spirometer and SCDs in place and working good. Voiding well in a urinal. Vital signs good . Family member in the room with the pt. Will  Continue with plan of care. Lucero Grimaldo

## 2017-06-03 NOTE — PROGRESS NOTES
Mobility Intervention:       [] Pt dangled at edge of bed    [] Pt assisted OOB to bedside commode    [] Pt assisted OOB to chair    [] Pt ambulated to bathroom    [x] Patient was ambulated in room/hallway    Assistive Device Utilized (check all that apply):       [] Rolling walker   [] Crutches   [] Straight Cane   [] Knee immobilizer   [] IV pole    After Mobilization:     [] Patient left in no apparent distress sitting up in chair  [x] Patient left in no apparent distress in bed  [x] Call bell left within reach  Assistive Device:        [] RN notified  [] Caregiver present  [] Bed alarm activated    Comments:

## 2017-06-03 NOTE — ROUTINE PROCESS
Bedside and Verbal shift change report given to [address] (oncoming nurse) by Leonardo Rivas RN (offgoing nurse). Report included the following information SBAR, Kardex, MAR and Recent Results. SITUATION:    Code Status: Full Code   Reason for Admission: Epididymitis   Epididymitis, bilateral    [facility] day: 1   Problem List:       Hospital Problems  Date Reviewed: 6/2/2017          Codes Class Noted POA    Epididymitis, bilateral ICD-10-CM: N45.1  ICD-9-CM: 604.90  6/2/2017 Unknown              BACKGROUND:    Past Medical History:   Past Medical History:   Diagnosis Date    ADHD (attention deficit hyperactivity disorder)     GERD (gastroesophageal reflux disease)     Hematuria     Hypertension     Pancreatitis     Panic attack          Patient taking anticoagulants no     ASSESSMENT:    Changes in Assessment Throughout Shift: No     Patient has Central Line: no Reasons if yes: No   Patient has Ladd Cath: no Reasons if yes: Nio      Last Vitals:     Vitals:    06/02/17 1137 06/02/17 1505 06/02/17 1959   BP: 126/85 139/86 145/83   Pulse: 77 73 73   Resp: 22 20 19   Temp: 97.9 °F (36.6 °C) 97.8 °F (36.6 °C) 97.6 °F (36.4 °C)   SpO2: 99% 96% 94%        IV and DRAINS (will only show if present)   Peripheral IV 06/02/17 Left Hand-Site Assessment: Clean, dry, & intact     WOUND (if present)   Wound Type:  none   Dressing present Dressing Present : No   Wound Concerns/Notes:  none     PAIN    Pain Assessment    Pain Intensity 1: 6 (06/03/17 0514)    Pain Location 1: Scrotum    Pain Intervention(s) 1: Medication (see MAR), Family Support    Patient Stated Pain Goal: 0  o Interventions for Pain:  none  o Intervention effective: no  o Time of last intervention: 0700   o Reassessment Completed: no      Last 3 Weights: There were no vitals filed for this visit.   Weight change:      INTAKE/OUPUT    Current Shift: 06/02 1901 - 06/03 0700  In: -   Out: 500 [Urine:500]    Last three shifts: 06/01 0701 - 06/02 1900  In: -   Out: 350 [Urine:350]     LAB RESULTS     Recent Labs      06/02/17   1346   WBC  7.8   HGB  15.8   HCT  44.2   PLT  163        Recent Labs      06/03/17   0045  06/02/17   1346   NA  135*  139   K  3.7  3.7   GLU  126*  93   BUN  19*  21*   CREA  0.88  0.83   CA  8.6  9.7       RECOMMENDATIONS AND DISCHARGE PLANNING     1. Pending tests/procedures/ Plan of Care or Other Needs: TBD     2. Discharge plan for patient and Needs/Barriers: TBD    3. Estimated Discharge Date: TBD Posted on Whiteboard in Patients Room: no      4. The patient's care plan was reviewed with the oncoming nurse. \"HEALS\" SAFETY CHECK      Fall Risk    Total Score: 1    Safety Measures: Safety Measures: Bed/Chair alarm on, Bed/Chair-Wheels locked, Bed in low position, Call light within reach, Fall prevention (comment)    A safety check occurred in the patient's room between off going nurse and oncoming nurse listed above. The safety check included the below items  Area Items   H  High Alert Medications - Verify all high alert medication drips (heparin, PCA, etc.)   E  Equipment - Suction is set up for ALL patients (with reese)  - Red plugs utilized for all equipment (IV pumps, etc.)  - WOWs wiped down at end of shift.  - Room stocked with oxygen, suction, and other unit-specific supplies   A  Alarms - Bed alarm is set for fall risk patients  - Ensure chair alarm is in place and activated if patient is up in a chair   L  Lines - Check IV for any infiltration  - Ladd bag is empty if patient has a Ladd   - Tubing and IV bags are labeled   S  Safety   - Room is clean, patient is clean, and equipment is clean. - Hallways are clear from equipment besides carts. - Fall bracelet on for fall risk patients  - Ensure room is clear and free of clutter  - Suction is set up for ALL patients (with reese)  - Hallways are clear from equipment besides carts.    - Isolation precautions followed, supplies available outside room, sign posted     Wendy Martell, MIN

## 2017-06-03 NOTE — PROGRESS NOTES
His vital signs remained stable and he has no fever no tachycardia. Today he is complaining of a headache and I have resumed his home medications. He does have a history of migraines according to him and his wife so this should help that. Once again he is complaining continually of pain in either testicle but it is not as severe as it has been. He has been able to get up and walk around and I would like for him to become more ambulatory. On physical exam once again I am easily able to examine both testicles and epididymis without the apprehension that he had before. Examination of the penis again reveals no abnormalities and no swelling or discoloration that I see. At this point in time, once again I have no real explanation for his orchialgia and pelvic pain. I am going to go ahead and get an abdominal ultrasound. His urine output seems adequate although he has not had a bowel movement in a day or 2 I think that is because of both the pain medication, lack of exercise, and probably not eating well. I plan to keep him in the hospital another 24 hours and I think if he still complaining of difficulty urinating and pain in the testicles, I will probably try to schedule cystoscopy on him perhaps on Monday the fifth.

## 2017-06-04 VITALS
SYSTOLIC BLOOD PRESSURE: 130 MMHG | OXYGEN SATURATION: 100 % | HEART RATE: 82 BPM | RESPIRATION RATE: 16 BRPM | DIASTOLIC BLOOD PRESSURE: 88 MMHG | TEMPERATURE: 98.2 F

## 2017-06-04 LAB
BACTERIA SPEC CULT: NORMAL
SERVICE CMNT-IMP: NORMAL

## 2017-06-04 PROCEDURE — 74011258636 HC RX REV CODE- 258/636: Performed by: UROLOGY

## 2017-06-04 PROCEDURE — 74011250637 HC RX REV CODE- 250/637: Performed by: UROLOGY

## 2017-06-04 PROCEDURE — 74011250636 HC RX REV CODE- 250/636: Performed by: UROLOGY

## 2017-06-04 RX ORDER — KETOROLAC TROMETHAMINE 10 MG/1
10 TABLET, FILM COATED ORAL
Qty: 20 TAB | Refills: 0 | Status: SHIPPED | OUTPATIENT
Start: 2017-06-04 | End: 2017-10-24

## 2017-06-04 RX ORDER — KETOROLAC TROMETHAMINE 10 MG/1
10 TABLET, FILM COATED ORAL
Status: DISCONTINUED | OUTPATIENT
Start: 2017-06-04 | End: 2017-06-04 | Stop reason: HOSPADM

## 2017-06-04 RX ORDER — CEPHALEXIN 500 MG/1
500 CAPSULE ORAL 2 TIMES DAILY
Qty: 14 CAP | Refills: 0 | Status: SHIPPED | OUTPATIENT
Start: 2017-06-04 | End: 2017-06-11

## 2017-06-04 RX ADMIN — CEFAZOLIN SODIUM 2 G: 2 SOLUTION INTRAVENOUS at 05:54

## 2017-06-04 RX ADMIN — DEXTROAMPHETAMINE SACCHARATE, AMPHETAMINE ASPARTATE, DEXTROAMPHETAMINE SULFATE AND AMPHETAMINE SULFATE 30 MG: 5; 5; 5; 5 TABLET ORAL at 08:11

## 2017-06-04 RX ADMIN — CEFAZOLIN SODIUM 2 G: 2 SOLUTION INTRAVENOUS at 13:33

## 2017-06-04 RX ADMIN — Medication 10 ML: at 05:56

## 2017-06-04 RX ADMIN — KETOROLAC TROMETHAMINE 10 MG: 10 TABLET, FILM COATED ORAL at 13:33

## 2017-06-04 RX ADMIN — HYDROMORPHONE HYDROCHLORIDE 2 MG: 2 INJECTION, SOLUTION INTRAMUSCULAR; INTRAVENOUS; SUBCUTANEOUS at 04:10

## 2017-06-04 RX ADMIN — ACETAMINOPHEN 650 MG: 325 TABLET ORAL at 08:56

## 2017-06-04 RX ADMIN — HYDROMORPHONE HYDROCHLORIDE 2 MG: 2 INJECTION, SOLUTION INTRAMUSCULAR; INTRAVENOUS; SUBCUTANEOUS at 08:11

## 2017-06-04 RX ADMIN — VERAPAMIL HYDROCHLORIDE 40 MG: 80 TABLET, FILM COATED ORAL at 08:11

## 2017-06-04 RX ADMIN — ALPRAZOLAM 1 MG: 1 TABLET ORAL at 01:50

## 2017-06-04 RX ADMIN — SODIUM CHLORIDE, SODIUM LACTATE, POTASSIUM CHLORIDE, CALCIUM CHLORIDE, AND DEXTROSE MONOHYDRATE 125 ML/HR: 600; 310; 30; 20; 5 INJECTION, SOLUTION INTRAVENOUS at 04:20

## 2017-06-04 NOTE — DISCHARGE INSTRUCTIONS
DISCHARGE SUMMARY from Nurse    The following personal items are in your possession at time of discharge:    Dental Appliances: None  Visual Aid: Glasses     Home Medications: None  Jewelry: None  Clothing: At bedside  Other Valuables: Cell Phone, Ivet [place], Other (comment) (tablet)             PATIENT INSTRUCTIONS:    After general anesthesia or intravenous sedation, for 24 hours or while taking prescription Narcotics:  · Limit your activities  · Do not drive and operate hazardous machinery  · Do not make important personal or business decisions  · Do  not drink alcoholic beverages  · If you have not urinated within 8 hours after discharge, please contact your surgeon on call. Report the following to your surgeon:  · Excessive pain, swelling, redness or odor of or around the surgical area  · Temperature over 100.5  · Nausea and vomiting lasting longer than 4 hours or if unable to take medications  · Any signs of decreased circulation or nerve impairment to extremity: change in color, persistent  numbness, tingling, coldness or increase pain  · Any questions        What to do at Home:  Recommended activity: Activity as tolerated. *  Please give a list of your current medications to your Primary Care Provider. *  Please update this list whenever your medications are discontinued, doses are      changed, or new medications (including over-the-counter products) are added. *  Please carry medication information at all times in case of emergency situations. These are general instructions for a healthy lifestyle:    No smoking/ No tobacco products/ Avoid exposure to second hand smoke    Surgeon General's Warning:  Quitting smoking now greatly reduces serious risk to your health.     Obesity, smoking, and sedentary lifestyle greatly increases your risk for illness    A healthy diet, regular physical exercise & weight monitoring are important for maintaining a healthy lifestyle    You may be retaining fluid if you have a history of heart failure or if you experience any of the following symptoms:  Weight gain of 3 pounds or more overnight or 5 pounds in a week, increased swelling in our hands or feet or shortness of breath while lying flat in bed. Please call your doctor as soon as you notice any of these symptoms; do not wait until your next office visit. Recognize signs and symptoms of STROKE:    F-face looks uneven    A-arms unable to move or move unevenly    S-speech slurred or non-existent    T-time-call 911 as soon as signs and symptoms begin-DO NOT go       Back to bed or wait to see if you get better-TIME IS BRAIN. Warning Signs of HEART ATTACK     Call 911 if you have these symptoms:   Chest discomfort. Most heart attacks involve discomfort in the center of the chest that lasts more than a few minutes, or that goes away and comes back. It can feel like uncomfortable pressure, squeezing, fullness, or pain.  Discomfort in other areas of the upper body. Symptoms can include pain or discomfort in one or both arms, the back, neck, jaw, or stomach.  Shortness of breath with or without chest discomfort.  Other signs may include breaking out in a cold sweat, nausea, or lightheadedness. Don't wait more than five minutes to call 911 - MINUTES MATTER! Fast action can save your life. Calling 911 is almost always the fastest way to get lifesaving treatment. Emergency Medical Services staff can begin treatment when they arrive -- up to an hour sooner than if someone gets to the hospital by car. The discharge information has been reviewed with the patient. The patient verbalized understanding. Discharge medications reviewed with the patient and appropriate educational materials and side effects teaching were provided. [employer] Activation    Thank you for requesting access to [employer]. Please follow the instructions below to securely access and download your online medical record.  [employer] allows you to send messages to your doctor, view your test results, renew your prescriptions, schedule appointments, and more. How Do I Sign Up? 1. In your internet browser, go to [email or web address]  2. Click on the First Time User? Click Here link in the Sign In box. You will be redirect to the New Member Sign Up page. 3. Enter your [employer] Access Code exactly as it appears below. You will not need to use this code after youve completed the sign-up process. If you do not sign up before the expiration date, you must request a new code. [employer] Access Code: [email or web address]  Expires: 2017  9:38 AM (This is the date your [employer] access code will )    4. Enter the last four digits of your Social Security Number (xxxx) and Date of Birth (mm/dd/yyyy) as indicated and click Submit. You will be taken to the next sign-up page. 5. Create a [employer] ID. This will be your [employer] login ID and cannot be changed, so think of one that is secure and easy to remember. 6. Create a [employer] password. You can change your password at any time. 7. Enter your Password Reset Question and Answer. This can be used at a later time if you forget your password. 8. Enter your e-mail address. You will receive e-mail notification when new information is available in [address] 9. Click Sign Up. You can now view and download portions of your medical record. 10. Click the Download Summary menu link to download a portable copy of your medical information. Additional Information    If you have questions, please visit the Frequently Asked Questions section of the [employer] website at [email or web address] Remember, [employer] is NOT to be used for urgent needs. For medical emergencies, dial 911. Patient armband removed and shredded         Epididymitis and Orchitis: Care Instructions  Your Care Instructions    Epididymitis is pain and swelling of the tube that is attached to each testicle.  This tube is called the epididymis. Orchitis is pain and swelling of the testicle. Infection with bacteria often causes these conditions. Sexually transmitted infections (STIs) also can cause both conditions. This is often the case in men younger than 28. Other causes in boys and older men are infections from surgery or having a catheter that drains urine. The mumps virus also can cause orchitis. Anti-inflammatory or pain medicines can help with the pain. Antibiotics are used if the problem is caused by bacteria. They are not used if a virus is the cause. Your testicle may stay swollen for many days or even a few weeks. The doctor has checked you carefully, but problems can develop later. If you notice any problems or new symptoms, get medical treatment right away. Follow-up care is a key part of your treatment and safety. Be sure to make and go to all appointments, and call your doctor if you are having problems. It's also a good idea to know your test results and keep a list of the medicines you take. How can you care for yourself at home? · If your doctor prescribed antibiotics, take them as directed. Do not stop taking them just because you feel better. You need to take the full course of antibiotics. · Ask your doctor if you can take an over-the-counter pain medicine, such as acetaminophen (Tylenol), ibuprofen (Advil, Motrin), or naproxen (Aleve). Be safe with medicines. Read and follow all instructions on the label. · Limit your activity to what is comfortable. · Wear snug underwear or an athletic supporter. This can help reduce pain. · Apply either cold or heat to the swollen area. Use the one that works best for your pain. Sitting in a warm bath for 15 minutes twice a day will help reduce the swelling more quickly. · If you have been told that an STI may have caused your condition, do not have sex until your doctor says it is safe. This will prevent spreading the infection.  Tell your sex partner or partners that they need to be checked. They may need treatment. When should you call for help? Call your doctor now or seek immediate medical care if:  · Your pain gets worse. · You have a new or higher fever. · You have new or more swelling of your testicle. · You have new belly pain, or your pain gets worse. Watch closely for changes in your health, and be sure to contact your doctor if:  · You do not get better as expected. Where can you learn more? Go to http://alize-maryann.info/. Enter S360 in the search box to learn more about \"Epididymitis and Orchitis: Care Instructions. \"  Current as of: August 12, 2016  Content Version: 11.2  © 7031-5163 [employer]. Care instructions adapted under license by [employer] (which disclaims liability or warranty for this information). If you have questions about a medical condition or this instruction, always ask your healthcare professional. [address] any warranty or liability for your use of this information.

## 2017-06-04 NOTE — ROUTINE PROCESS
Bedside and Verbal shift change report given to [address] (oncoming nurse) by Cynthia Bella RN (offgoing nurse). Report included the following information SBAR, Kardex, MAR and Recent Results. SITUATION:    Code Status: Full Code   Reason for Admission: Epididymitis   Epididymitis, bilateral    [facility] day: 2   Problem List:       Hospital Problems  Date Reviewed: 6/2/2017          Codes Class Noted POA    Epididymitis, bilateral ICD-10-CM: N45.1  ICD-9-CM: 604.90  6/2/2017 Unknown              BACKGROUND:    Past Medical History:   Past Medical History:   Diagnosis Date    ADHD (attention deficit hyperactivity disorder)     GERD (gastroesophageal reflux disease)     Hematuria     Hypertension     Pancreatitis     Panic attack          Patient taking anticoagulants no     ASSESSMENT:    Changes in Assessment Throughout Shift: no     Patient has Central Line: no Reasons if yes: no   Patient has Ladd Cath: no Reasons if yes: no      Last Vitals:     Vitals:    06/03/17 1103 06/03/17 1709 06/03/17 1941 06/04/17 0410   BP: 134/85 (!) 152/94 142/89 134/88   Pulse: (!) 52 94 89 77   Resp: 17 18 18 18   Temp: 98 °F (36.7 °C) 98.5 °F (36.9 °C) 97.7 °F (36.5 °C) 97.1 °F (36.2 °C)   SpO2: 96% 99% 96% 99%        IV and DRAINS (will only show if present)   Peripheral IV 06/02/17 Left Hand-Site Assessment: Clean, dry, & intact     WOUND (if present)   Wound Type:  none   Dressing present Dressing Present : No   Wound Concerns/Notes:  none     PAIN    Pain Assessment    Pain Intensity 1: 7 (06/04/17 0411)    Pain Location 1: Scrotum    Pain Intervention(s) 1: Medication (see MAR)    Patient Stated Pain Goal: 0  o Interventions for Pain:  none  o Intervention effective: no  o Time of last intervention: 0410   o Reassessment Completed: no      Last 3 Weights: There were no vitals filed for this visit.   Weight change:      INTAKE/OUPUT    Current Shift: 06/03 1901 - 06/04 0700  In: 1350 [I.V.:1350]  Out: 1300 [Urine:1300]    Last three shifts: 06/02 0701 - 06/03 1900  In: 5048 [P.O.:480; I.V.:2675]  Out: 2500 [Urine:2500]     LAB RESULTS     Recent Labs      06/02/17   1346   WBC  7.8   HGB  15.8   HCT  44.2   PLT  163        Recent Labs      06/03/17   0045  06/02/17   1346   NA  135*  139   K  3.7  3.7   GLU  126*  93   BUN  19*  21*   CREA  0.88  0.83   CA  8.6  9.7       RECOMMENDATIONS AND DISCHARGE PLANNING     1. Pending tests/procedures/ Plan of Care or Other Needs: US results and Labs. 2. Discharge plan for patient and Needs/Barriers: home    3. Estimated Discharge Date: [id number] Posted on Whiteboard in Patients Room: no      4. The patient's care plan was reviewed with the oncoming nurse. \"HEALS\" SAFETY CHECK      Fall Risk    Total Score: 1    Safety Measures: Safety Measures: Bed in low position    A safety check occurred in the patient's room between off going nurse and oncoming nurse listed above. The safety check included the below items  Area Items   H  High Alert Medications - Verify all high alert medication drips (heparin, PCA, etc.)   E  Equipment - Suction is set up for ALL patients (with rolandoker)  - Red plugs utilized for all equipment (IV pumps, etc.)  - WOWs wiped down at end of shift.  - Room stocked with oxygen, suction, and other unit-specific supplies   A  Alarms - Bed alarm is set for fall risk patients  - Ensure chair alarm is in place and activated if patient is up in a chair   L  Lines - Check IV for any infiltration  - Ladd bag is empty if patient has a Ladd   - Tubing and IV bags are labeled   S  Safety   - Room is clean, patient is clean, and equipment is clean. - Hallways are clear from equipment besides carts. - Fall bracelet on for fall risk patients  - Ensure room is clear and free of clutter  - Suction is set up for ALL patients (with rolandoker)  - Hallways are clear from equipment besides carts.    - Isolation precautions followed, supplies available outside room, sign posted     Marjan Durán RN

## 2017-06-04 NOTE — PROGRESS NOTES
Mobility Intervention:       [] Pt dangled at edge of bed    [] Pt assisted OOB to bedside commode    [] Pt assisted OOB to chair    [] Pt ambulated to bathroom    [x] Patient was ambulated in room/hallway    Assistive Device Utilized (check all that apply):       [] Rolling walker   [] Crutches   [] Straight Cane   [] Knee immobilizer   [] IV pole    After Mobilization:     [] Patient left in no apparent distress sitting up in chair  [x] Patient left in no apparent distress in bed  [] Call bell left within reach  Assistive Device:        [] RN notified  [] Caregiver present  [] Bed alarm activated    Comments:

## 2017-06-04 NOTE — PROGRESS NOTES
His vital signs are all normal, his blood cultures are negative for 2 days and urine culture is pending but his urine was clear. He has not had a fever the entire hospital stay. Currently he is up and walking, much better and complaining of much less pain. At this point in time, I am going to stop his intravenous Dilaudid and convert him over to Toradol orally. I am going to stop the gentamicin and still give him 1 more dose of cefazolin and revisit him this afternoon. If he still doing well I will probably go ahead and discharge him this evening and follow him up in the office. His abdominal ultrasound showed no pathologic cause for his pain and I have gone over with him the frustration of not being able to precisely diagnose his painful testicles. However there is no clinical evidence of any kind of disease causing his orchialgia at this point in time.

## 2017-06-04 NOTE — ROUTINE PROCESS
1000Patient is alert and oriented x4. Denies any acute distress, SOB or chest pain. VSS. Palpable pedal pulses. Lungs are clear. Bowel sounds are active in all 4 quadrants. Passing flatus. Patient tolerating regular diet. No complaints of nausea or vomiting. Voiding without any difficulty. Ambulating around the unit, tolerating well. No neurological deficits noted. Bed is in low position, call light is within reach. Will continue to monitor. 1400 No changes since last assessment.

## 2017-06-04 NOTE — PROGRESS NOTES
Patient is given discharge instructions and prescriptions. Verbalizes understanding. IV is taken out, arm band removed and shredded.

## 2017-06-05 NOTE — DISCHARGE SUMMARY
[facility] [address]  [address] [place] SUMMARY    Name:  Evan Ruiz  MR#:  [id number]  :  1980  Account #:  [de-identified]  Date of Adm:  2017  Date of Discharge:  2017      HISTORY OF PRESENT ILLNESS: The patient is a 41-year-old man  who was admitted emergently about 4 days ago with severe testicular  pain felt to be epididymitis and orchitis in origin. He had been to the  emergency room with the same problem earlier in the week and  despite aggressive treatment with Cipro and pain medication still was  not any better. CT scan showed normal upper tracts at that time, but  he has bilateral hip prostheses and there was a lot of scatter  compromising the CT of the pelvis. He was admitted with a  presumptive diagnosis of epididymitis and orchitis. He had been seen  in the office on Thursday and given 160 mg of gentamicin. He felt a  little better the next day, but by the time he got back to the office for a  followup recheck he was still in severe pain, so I admitted him then. HOSPITAL COURSE: Once admitted, he was given IV fluids  including Ancef and gentamicin. Blood cultures were negative. Urine  cultures also were negative and with IV fluids, IV antibiotics, pain  control he was better after 48 hours and by the third day, he was much  better and ready to go home. He never ran a fever. His white count on  admission was 7800, hemoglobin was 15.8, and the remainder of his  blood work was pretty normal. He did have a little bit of an elevated  BUN, but I think that was certainly because of dehydration. At any rate,  he became much more comfortable, ambulatory, was able to eat. We  stopped his IV pain medication and gentamicin. He is discharged to  continue his home medications which are listed in his chart and I am  going to send him home on Keflex 500 b.i.d. for another week and  Toradol for pain.  I will see him back in the office in about a month,  unless the symptoms recur.    DISCHARGE DIAGNOSES:  1. Bilateral epididymal orchitis. 2. History of aseptic necrosis bilaterally. 3. History of pancreatitis.         MD GOYO Downs / Andrade Pritchett  D:  06/04/2017   15:56  T:  06/05/2017   06:11  Job #:  [id number]

## 2017-06-08 LAB
BACTERIA SPEC CULT: NORMAL
BACTERIA SPEC CULT: NORMAL
SERVICE CMNT-IMP: NORMAL
SERVICE CMNT-IMP: NORMAL

## 2017-06-13 ENCOUNTER — PATIENT OUTREACH (OUTPATIENT)
Dept: FAMILY MEDICINE CLINIC | Age: 37
End: 2017-06-13

## 2017-06-30 DIAGNOSIS — I15.9 SECONDARY HYPERTENSION: ICD-10-CM

## 2017-06-30 RX ORDER — VERAPAMIL HYDROCHLORIDE 40 MG/1
TABLET ORAL
Qty: 90 TAB | Refills: 0 | Status: SHIPPED | OUTPATIENT
Start: 2017-06-30 | End: 2017-10-06 | Stop reason: SDUPTHER

## 2017-06-30 NOTE — TELEPHONE ENCOUNTER
Pt called for refill but it was already requested by the pharmacy. He states that he has run out of the med.

## 2017-10-24 ENCOUNTER — OFFICE VISIT (OUTPATIENT)
Dept: FAMILY MEDICINE CLINIC | Age: 37
End: 2017-10-24

## 2017-10-24 VITALS
OXYGEN SATURATION: 97 % | RESPIRATION RATE: 18 BRPM | WEIGHT: 214 LBS | TEMPERATURE: 97.8 F | DIASTOLIC BLOOD PRESSURE: 104 MMHG | HEART RATE: 100 BPM | HEIGHT: 72 IN | SYSTOLIC BLOOD PRESSURE: 137 MMHG | BODY MASS INDEX: 28.99 KG/M2

## 2017-10-24 DIAGNOSIS — M25.561 ACUTE PAIN OF RIGHT KNEE: ICD-10-CM

## 2017-10-24 DIAGNOSIS — S93.421A SPRAIN OF DELTOID LIGAMENT OF RIGHT ANKLE, INITIAL ENCOUNTER: ICD-10-CM

## 2017-10-24 DIAGNOSIS — S76.311A HAMSTRING MUSCLE STRAIN, RIGHT, INITIAL ENCOUNTER: Primary | ICD-10-CM

## 2017-10-24 RX ORDER — NABUMETONE 750 MG/1
750 TABLET, FILM COATED ORAL 2 TIMES DAILY
Qty: 30 TAB | Refills: 0 | Status: SHIPPED | OUTPATIENT
Start: 2017-10-24

## 2017-10-24 RX ORDER — HYDROCODONE BITARTRATE AND ACETAMINOPHEN 5; 325 MG/1; MG/1
1 TABLET ORAL
Qty: 20 TAB | Refills: 0 | Status: SHIPPED | OUTPATIENT
Start: 2017-10-24 | End: 2017-11-08 | Stop reason: SDUPTHER

## 2017-10-24 RX ORDER — KETOROLAC TROMETHAMINE 30 MG/ML
60 INJECTION, SOLUTION INTRAMUSCULAR; INTRAVENOUS ONCE
Qty: 2 VIAL | Refills: 0 | Status: SHIPPED | COMMUNITY
Start: 2017-10-24 | End: 2017-10-24

## 2017-10-24 NOTE — PROGRESS NOTES
Hip Pain        HPI: Víctor Kwong is a 42 y.o. male WHITE [place]   Here with acute ankle, knee and hip pain. This past Saturday he stumbled stepping into a 2 foot pothole in sand. He felt he inverted the right ankle and had varus movement of right knee. Prior to this he had moved a heavy treadmill down a flight of stairs. Sunday was working underneath a car and the car rolled back against him and he now has hip pain. Denies any bruising. Pain is worsened with movement. Past Medical History:   Diagnosis Date    ADHD (attention deficit hyperactivity disorder)     GERD (gastroesophageal reflux disease)     Hematuria     Hypertension     Pancreatitis     Panic attack        Current Outpatient Prescriptions   Medication Sig    verapamil (CALAN) 40 mg tablet TAKE ONE TABLET BY MOUTH THREE TIMES DAILY WITH MEALS FOR CLUSTER HEADACHE PREVENTION (GENERIC CALAN)    ALPRAZolam (XANAX) 0.5 mg tablet Take  by mouth.  TRAZODONE HCL (TRAZODONE PO) Take  by mouth.  dextroamphetamine-amphetamine (ADDERALL) 30 mg tablet Take 30 mg by mouth three (3) times daily.  ketorolac (TORADOL) 10 mg tablet Take 1 Tab by mouth every six (6) hours as needed for up to 20 doses.  SUMAtriptan (IMITREX) 50 mg tablet 1 tab at onset ok to use second tablet if headache not resolved in 2 hours. Max of 2 daily     No current facility-administered medications for this visit. Allergies   Allergen Reactions    Prednisone Swelling and Hives     Other reaction(s): neurological reaction  Rash & headaches    Phenergan [Promethazine] Other (comments)     HALLUCINATIONS  hallucinations    Tramadol Other (comments) and Nausea Only     Patient complains of headaches.        Past Surgical History:   Procedure Laterality Date    EXCISE VARICOCELE      HX APPENDECTOMY      HX HERNIA REPAIR      double hernia    HX HIP REPLACEMENT Right 2014    HX HIP REPLACEMENT Left 2013       Family History   Problem Relation Age of Onset    Thyroid Disease Mother     Hypertension Father     Heart Attack Father     Cancer Sister      lung    Ovarian Cancer Sister     Alcohol abuse Brother        Social History     Social History    Marital status:      Spouse name: N/A    Number of children: N/A    Years of education: N/A     Occupational History          Social History Main Topics    Smoking status: Never Smoker    Smokeless tobacco: Never Used    Alcohol use No      Comment: weekend    Drug use: No    Sexual activity: Yes     Partners: Female     Other Topics Concern    Not on file     Social History Narrative       Review of Systems   Musculoskeletal: Positive for joint pain. Visit Vitals    BP (!) 137/104 (BP 1 Location: Right arm, BP Patient Position: Sitting)    Pulse 100    Temp 97.8 °F (36.6 °C) (Oral)    Resp 18    Ht 6' (1.829 m)    Wt 214 lb (97.1 kg)    SpO2 97%    BMI 29.02 kg/m2       Physical Exam   Constitutional: He is oriented to person, place, and time. He appears well-developed and well-nourished. He appears distressed (appears in pain). HENT:   Head: Normocephalic and atraumatic. Right Ear: External ear normal.   Left Ear: External ear normal.   Musculoskeletal:        Right hip: He exhibits no bony tenderness. Right ankle: He exhibits swelling (deltoid). No lateral malleolus and no medial malleolus tenderness found. Right upper leg: He exhibits tenderness (below right gluteus). He exhibits no bony tenderness, no swelling, no edema and no deformity. Neurological: He is alert and oriented to person, place, and time. Skin: Skin is warm and dry. He is not diaphoretic. Nursing note and vitals reviewed. Assesment:  1. Hamstring muscle strain, right, initial encounter    - ketorolac (TORADOL) 30 mg/mL (1 mL) injection; 2 mL by IntraMUSCular route once for 1 dose.   Dispense: 2 Vial; Refill: 0  - nabumetone (RELAFEN) 750 mg tablet; Take 1 Tab by mouth two (2) times a day. Dispense: 30 Tab; Refill: 0  - HYDROcodone-acetaminophen (NORCO) 5-325 mg per tablet; Take 1 Tab by mouth every eight (8) hours as needed for Pain. Max Daily Amount: 3 Tabs. Dispense: 20 Tab; Refill: 0    2. Acute pain of right knee    - ketorolac (TORADOL) 30 mg/mL (1 mL) injection; 2 mL by IntraMUSCular route once for 1 dose. Dispense: 2 Vial; Refill: 0  - nabumetone (RELAFEN) 750 mg tablet; Take 1 Tab by mouth two (2) times a day. Dispense: 30 Tab; Refill: 0  - HYDROcodone-acetaminophen (NORCO) 5-325 mg per tablet; Take 1 Tab by mouth every eight (8) hours as needed for Pain. Max Daily Amount: 3 Tabs. Dispense: 20 Tab; Refill: 0    3. Sprain of deltoid ligament of right ankle, initial encounter    - ketorolac (TORADOL) 30 mg/mL (1 mL) injection; 2 mL by IntraMUSCular route once for 1 dose. Dispense: 2 Vial; Refill: 0  - nabumetone (RELAFEN) 750 mg tablet; Take 1 Tab by mouth two (2) times a day. Dispense: 30 Tab; Refill: 0  - HYDROcodone-acetaminophen (NORCO) 5-325 mg per tablet; Take 1 Tab by mouth every eight (8) hours as needed for Pain. Max Daily Amount: 3 Tabs. Dispense: 20 Tab; Refill: 0     Use ice on the ankle, knee and behind buttocks for 15 minutes three times a day. I have discussed the diagnosis with the patient and the intended management  The patient has received an after-visit summary and questions were answered concerning future plans. I have discussed medication usage, side effects and warnings with the patient as well. I have reviewed the plan of care with the patient, accepted their input and they are in agreement with the treatment goals. Follow-up Disposition:  Return if symptoms worsen or fail to improve.       Carolina Lockhart MD                .

## 2017-10-24 NOTE — PATIENT INSTRUCTIONS
Use ice on the ankle, knee and behind buttocks for 15 minutes three times a day. Ankle Sprain: Care Instructions  Your Care Instructions    An ankle sprain can happen when you twist your ankle. The ligaments that support the ankle can get stretched and torn. Often the ankle is swollen and painful. Ankle sprains may take from several weeks to several months to heal. Usually, the more pain and swelling you have, the more severe your ankle sprain is and the longer it will take to heal. You can heal faster and regain strength in your ankle with good home treatment. It is very important to give your ankle time to heal completely, so that you do not easily hurt your ankle again. Follow-up care is a key part of your treatment and safety. Be sure to make and go to all appointments, and call your doctor if you are having problems. It's also a good idea to know your test results and keep a list of the medicines you take. How can you care for yourself at home? · Prop up your foot on pillows as much as possible for the next 3 days. Try to keep your ankle above the level of your heart. This will help reduce the swelling. · Follow your doctor's directions for wearing a splint or elastic bandage. Wrapping the ankle may help reduce or prevent swelling. · Your doctor may give you a splint, a brace, an air stirrup, or another form of ankle support to protect your ankle until it is healed. Wear it as directed while your ankle is healing. Do not remove it unless your doctor tells you to. After your ankle has healed, ask your doctor whether you should wear the brace when you exercise. · Put ice or cold packs on your injured ankle for 10 to 20 minutes at a time. Try to do this every 1 to 2 hours for the next 3 days (when you are awake) or until the swelling goes down. Put a thin cloth between the ice and your skin. · You may need to use crutches until you can walk without pain.  If you do use crutches, try to bear some weight on your injured ankle if you can do so without pain. This helps the ankle heal.  · Take pain medicines exactly as directed. ¨ If the doctor gave you a prescription medicine for pain, take it as prescribed. ¨ If you are not taking a prescription pain medicine, ask your doctor if you can take an over-the-counter medicine. · If you have been given ankle exercises to do at home, do them exactly as instructed. These can promote healing and help prevent lasting weakness. When should you call for help? Call your doctor now or seek immediate medical care if:  · Your pain is getting worse. · Your swelling is getting worse. · Your splint feels too tight or you are unable to loosen it. Watch closely for changes in your health, and be sure to contact your doctor if:  · You are not getting better after 1 week. Where can you learn more? Go to http://alize-maryann.info/. Enter J722 in the search box to learn more about \"Ankle Sprain: Care Instructions. \"  Current as of: March 21, 2017  Content Version: 11.3  © 4405-3628 [employer]. Care instructions adapted under license by [employer] (which disclaims liability or warranty for this information). If you have questions about a medical condition or this instruction, always ask your healthcare professional. [address] any warranty or liability for your use of this information.

## 2017-10-24 NOTE — MR AVS SNAPSHOT
Visit Information Date & Time Provider Department Dept. Phone Encounter #  
 10/24/2017 10:30 AM Codi Reece, [employer] [phone] [id number] Upcoming Health Maintenance Date Due DTaP/Tdap/Td series (1 - Tdap) 3/9/1999 INFLUENZA AGE 9 TO ADULT 8/1/2017 Allergies as of 10/24/2017  Review Complete On: 10/24/2017 By: Arnie Gonzalez LPN Severity Noted Reaction Type Reactions Prednisone Medium 10/24/2012    Swelling, Hives Other reaction(s): neurological reaction Rash & headaches Phenergan [Promethazine]  05/23/2011    Other (comments) HALLUCINATIONS 
hallucinations Tramadol  10/24/2012    Other (comments), Nausea Only Patient complains of headaches. Current Immunizations  Never Reviewed Name Date Influenza Vaccine PF 10/19/2015 Not reviewed this visit You Were Diagnosed With   
  
 Codes Comments Hamstring muscle strain, right, initial encounter    -  Primary ICD-10-CM: U51.932L ICD-9-CM: 843.8 Acute pain of right knee     ICD-10-CM: M25.561 ICD-9-CM: 719.46 Sprain of deltoid ligament of right ankle, initial encounter     ICD-10-CM: C61.571Z ICD-9-CM: 845.01 Vitals BP Pulse Temp Resp Height(growth percentile) Weight(growth percentile) (!) 137/104 (BP 1 Location: Right arm, BP Patient Position: Sitting) 100 97.8 °F (36.6 °C) (Oral) 18 6' (1.829 m) 214 lb (97.1 kg) SpO2 BMI Smoking Status 97% 29.02 kg/m2 Never Smoker Vitals History BMI and BSA Data Body Mass Index Body Surface Area  
 29.02 kg/m 2 2.22 m 2 Preferred Pharmacy Pharmacy Name Phone [address], [address] [place] [address] [phone] Your Updated Medication List  
  
   
This list is accurate as of: 10/24/17 11:26 AM.  Always use your most recent med list.  
  
  
  
  
 ADDERALL 30 mg tablet Generic drug:  dextroamphetamine-amphetamine Take 30 mg by mouth three (3) times daily. HYDROcodone-acetaminophen 5-325 mg per tablet Commonly known as:  Lewis Garciaall Take 1 Tab by mouth every eight (8) hours as needed for Pain. Max Daily Amount: 3 Tabs.  
  
 ketorolac 30 mg/mL (1 mL) injection Commonly known as:  TORADOL  
2 mL by IntraMUSCular route once for 1 dose.  
  
 nabumetone 750 mg tablet Commonly known as:  RELAFEN Take 1 Tab by mouth two (2) times a day. SUMAtriptan 50 mg tablet Commonly known as:  IMITREX  
1 tab at onset ok to use second tablet if headache not resolved in 2 hours. Max of 2 daily TRAZODONE PO Take  by mouth.  
  
 verapamil 40 mg tablet Commonly known as:  CALAN  
TAKE ONE TABLET BY MOUTH THREE TIMES DAILY WITH MEALS FOR CLUSTER HEADACHE PREVENTION (GENERIC CALAN) XANAX 0.5 mg tablet Generic drug:  ALPRAZolam  
Take  by mouth. Prescriptions Printed Refills HYDROcodone-acetaminophen (NORCO) 5-325 mg per tablet 0 Sig: Take 1 Tab by mouth every eight (8) hours as needed for Pain. Max Daily Amount: 3 Tabs. Class: Print Route: Oral  
  
Prescriptions Sent to Pharmacy Refills  
 nabumetone (RELAFEN) 750 mg tablet 0 Sig: Take 1 Tab by mouth two (2) times a day. Class: Normal  
 Pharmacy: [address]  #: [phone] Route: Oral  
  
Patient Instructions Ankle Sprain: Care Instructions Your Care Instructions An ankle sprain can happen when you twist your ankle. The ligaments that support the ankle can get stretched and torn. Often the ankle is swollen and painful. Ankle sprains may take from several weeks to several months to heal. Usually, the more pain and swelling you have, the more severe your ankle sprain is and the longer it will take to heal. You can heal faster and regain strength in your ankle with good home treatment. It is very important to give your ankle time to heal completely, so that you do not easily hurt your ankle again. Follow-up care is a key part of your treatment and safety. Be sure to make and go to all appointments, and call your doctor if you are having problems. It's also a good idea to know your test results and keep a list of the medicines you take. How can you care for yourself at home? · Prop up your foot on pillows as much as possible for the next 3 days. Try to keep your ankle above the level of your heart. This will help reduce the swelling. · Follow your doctor's directions for wearing a splint or elastic bandage. Wrapping the ankle may help reduce or prevent swelling. · Your doctor may give you a splint, a brace, an air stirrup, or another form of ankle support to protect your ankle until it is healed. Wear it as directed while your ankle is healing. Do not remove it unless your doctor tells you to. After your ankle has healed, ask your doctor whether you should wear the brace when you exercise. · Put ice or cold packs on your injured ankle for 10 to 20 minutes at a time. Try to do this every 1 to 2 hours for the next 3 days (when you are awake) or until the swelling goes down. Put a thin cloth between the ice and your skin. · You may need to use crutches until you can walk without pain. If you do use crutches, try to bear some weight on your injured ankle if you can do so without pain. This helps the ankle heal. 
· Take pain medicines exactly as directed. ¨ If the doctor gave you a prescription medicine for pain, take it as prescribed. ¨ If you are not taking a prescription pain medicine, ask your doctor if you can take an over-the-counter medicine. · If you have been given ankle exercises to do at home, do them exactly as instructed. These can promote healing and help prevent lasting weakness. When should you call for help? Call your doctor now or seek immediate medical care if: · Your pain is getting worse. · Your swelling is getting worse. · Your splint feels too tight or you are unable to loosen it. Watch closely for changes in your health, and be sure to contact your doctor if: 
· You are not getting better after 1 week. Where can you learn more? Go to http://alize-maryann.info/. Enter C193 in the search box to learn more about \"Ankle Sprain: Care Instructions. \" Current as of: March 21, 2017 Content Version: 11.3 © 2931-0838 [employer]. Care instructions adapted under license by [employer] (which disclaims liability or warranty for this information). If you have questions about a medical condition or this instruction, always ask your healthcare professional. [address] any warranty or liability for your use of this information. Introducing [facility]! Sangeetha Fisher introduces [employer] patient portal. Now you can access parts of your medical record, email your doctor's office, and request medication refills online. 1. In your internet browser, go to [email or web address] 2. Click on the First Time User? Click Here link in the Sign In box. You will see the New Member Sign Up page. 3. Enter your [employer] Access Code exactly as it appears below. You will not need to use this code after youve completed the sign-up process. If you do not sign up before the expiration date, you must request a new code. · [employer] Access Code: [email or web address] Expires: 1/22/2018 11:26 AM 
 
4. Enter the last four digits of your Social Security Number (xxxx) and Date of Birth (mm/dd/yyyy) as indicated and click Submit. You will be taken to the next sign-up page. 5. Create a [employer] ID. This will be your [employer] login ID and cannot be changed, so think of one that is secure and easy to remember. 6. Create a [employer] password. You can change your password at any time. 7. Enter your Password Reset Question and Answer. This can be used at a later time if you forget your password. 8. Enter your e-mail address. You will receive e-mail notification when new information is available in [address] 9. Click Sign Up. You can now view and download portions of your medical record. 10. Click the Download Summary menu link to download a portable copy of your medical information. If you have questions, please visit the Frequently Asked Questions section of the [employer] website. Remember, [employer] is NOT to be used for urgent needs. For medical emergencies, dial 911. Now available from your iPhone and Android! Please provide this summary of care documentation to your next provider. Your primary care clinician is listed as Ba Echevarria. If you have any questions after today's visit, please call [phone].

## 2017-10-24 NOTE — LETTER
NOTIFICATION RETURN TO WORK / SCHOOL 
 
10/24/2017 11:06 AM 
 
Mr. Robb Chaudhary [address] [facility] [address] To Whom It May Concern: 
 
Robb Chaudhary is currently under the care of Doroteo Cordero. He will return to work/school on: Thursday, October 26, 2017 If there are questions or concerns please have the patient contact our office.  
 
 
 
Sincerely, 
 
 
Claire Govea MD

## 2017-11-08 ENCOUNTER — OFFICE VISIT (OUTPATIENT)
Dept: FAMILY MEDICINE CLINIC | Age: 37
End: 2017-11-08

## 2017-11-08 VITALS
SYSTOLIC BLOOD PRESSURE: 154 MMHG | DIASTOLIC BLOOD PRESSURE: 103 MMHG | RESPIRATION RATE: 18 BRPM | OXYGEN SATURATION: 96 % | WEIGHT: 227.2 LBS | HEART RATE: 97 BPM | BODY MASS INDEX: 30.77 KG/M2 | TEMPERATURE: 97.3 F | HEIGHT: 72 IN

## 2017-11-08 DIAGNOSIS — R35.0 URINARY FREQUENCY: ICD-10-CM

## 2017-11-08 DIAGNOSIS — K04.7 TOOTH INFECTION: ICD-10-CM

## 2017-11-08 DIAGNOSIS — K08.89 TOOTH PAIN: Primary | ICD-10-CM

## 2017-11-08 DIAGNOSIS — R52 PAIN: ICD-10-CM

## 2017-11-08 PROBLEM — G44.009 CLUSTER HEADACHES: Status: ACTIVE | Noted: 2017-11-08

## 2017-11-08 LAB
BILIRUB UR QL STRIP: NEGATIVE
GLUCOSE UR-MCNC: NEGATIVE MG/DL
KETONES P FAST UR STRIP-MCNC: NEGATIVE MG/DL
PH UR STRIP: 6.5 [PH] (ref 4.6–8)
PROT UR QL STRIP: NEGATIVE
SP GR UR STRIP: 1.01 (ref 1–1.03)
UA UROBILINOGEN AMB POC: NORMAL (ref 0.2–1)
URINALYSIS CLARITY POC: CLEAR
URINALYSIS COLOR POC: YELLOW
URINE BLOOD POC: NEGATIVE
URINE LEUKOCYTES POC: NEGATIVE
URINE NITRITES POC: NEGATIVE

## 2017-11-08 RX ORDER — AMOXICILLIN AND CLAVULANATE POTASSIUM 875; 125 MG/1; MG/1
1 TABLET, FILM COATED ORAL 2 TIMES DAILY
Qty: 20 TAB | Refills: 0 | Status: SHIPPED | OUTPATIENT
Start: 2017-11-08 | End: 2017-11-18

## 2017-11-08 RX ORDER — HYDROCODONE BITARTRATE AND ACETAMINOPHEN 5; 325 MG/1; MG/1
1 TABLET ORAL
Qty: 10 TAB | Refills: 0 | Status: SHIPPED | OUTPATIENT
Start: 2017-11-08

## 2017-11-08 RX ORDER — KETOROLAC TROMETHAMINE 30 MG/ML
60 INJECTION, SOLUTION INTRAMUSCULAR; INTRAVENOUS ONCE
Qty: 2 VIAL | Refills: 0 | Status: SHIPPED | COMMUNITY
Start: 2017-11-08 | End: 2017-11-08

## 2017-11-08 NOTE — PROGRESS NOTES
Dizziness        HPI: Víctor Kwong is a 42 y.o. male WHITE [place]   Here with right rear lower molar pain. He fractured this tooth several years ago. Pain started abruptly yesterday. Cheek is painful. No fevers. He is having waves of pain that is leading to lightheaded feelings also pain radiating into chest area. He reports increased urinary frequency since yesterday. Denies abdominal pain or blood in urine        Past Medical History:   Diagnosis Date    ADHD (attention deficit hyperactivity disorder)     GERD (gastroesophageal reflux disease)     Hematuria     Hypertension     Pancreatitis     Panic attack        Current Outpatient Prescriptions   Medication Sig    HYDROcodone-acetaminophen (NORCO) 5-325 mg per tablet Take 1 Tab by mouth two (2) times daily as needed for Pain. Max Daily Amount: 2 Tabs.  amoxicillin-clavulanate (AUGMENTIN) 875-125 mg per tablet Take 1 Tab by mouth two (2) times a day for 10 days.  verapamil (CALAN) 40 mg tablet TAKE ONE TABLET BY MOUTH THREE TIMES DAILY WITH MEALS FOR CLUSTER HEADACHE PREVENTION (GENERIC CALAN)    ALPRAZolam (XANAX) 0.5 mg tablet Take  by mouth.  TRAZODONE HCL (TRAZODONE PO) Take  by mouth.  dextroamphetamine-amphetamine (ADDERALL) 30 mg tablet Take 30 mg by mouth three (3) times daily.  nabumetone (RELAFEN) 750 mg tablet Take 1 Tab by mouth two (2) times a day.  SUMAtriptan (IMITREX) 50 mg tablet 1 tab at onset ok to use second tablet if headache not resolved in 2 hours. Max of 2 daily     No current facility-administered medications for this visit. Allergies   Allergen Reactions    Prednisone Swelling and Hives     Other reaction(s): neurological reaction  Rash & headaches    Phenergan [Promethazine] Other (comments)     HALLUCINATIONS  hallucinations    Tramadol Other (comments) and Nausea Only     Patient complains of headaches.        Past Surgical History:   Procedure Laterality Date    EXCISE VARICOCELE  HX APPENDECTOMY      HX HERNIA REPAIR      double hernia    HX HIP REPLACEMENT Right 2014    HX HIP REPLACEMENT Left 2013       Family History   Problem Relation Age of Onset    Thyroid Disease Mother     Hypertension Father     Heart Attack Father     Cancer Sister      lung    Ovarian Cancer Sister     Alcohol abuse Brother        Social History     Social History    Marital status:      Spouse name: N/A    Number of children: N/A    Years of education: N/A     Occupational History          Social History Main Topics    Smoking status: Never Smoker    Smokeless tobacco: Never Used    Alcohol use No      Comment: weekend    Drug use: No    Sexual activity: Yes     Partners: Female     Other Topics Concern    Not on file     Social History Narrative       Review of Systems   Constitutional: Negative for chills and fever. Respiratory: Negative for cough. Cardiovascular: Positive for chest pain and palpitations. Genitourinary: Positive for frequency. Negative for dysuria and hematuria. Visit Vitals    BP (!) 149/102 (BP 1 Location: Left arm, BP Patient Position: Supine)    Pulse (!) 106    Temp 97.3 °F (36.3 °C) (Oral)    Resp 18    Ht 6' (1.829 m)    Wt 227 lb 3.2 oz (103.1 kg)    SpO2 96%    BMI 30.81 kg/m2       Physical Exam   Constitutional: He appears well-developed and well-nourished. He appears distressed (appears uncomfortable). HENT:   Mouth/Throat:       Cardiovascular: Regular rhythm and normal heart sounds. Tachycardia present. No murmur heard. Pulmonary/Chest: Effort normal.   Abdominal: Normal appearance and bowel sounds are normal. There is tenderness in the right lower quadrant. There is no rigidity, no rebound and no guarding. Skin: He is not diaphoretic. Assesment:  1. Tooth pain    - HYDROcodone-acetaminophen (NORCO) 5-325 mg per tablet; Take 1 Tab by mouth two (2) times daily as needed for Pain.  Max Daily Amount: 2 Tabs.  Dispense: 10 Tab; Refill: 0  - ketorolac (TORADOL) 30 mg/mL (1 mL) injection; 2 mL by IntraMUSCular route once for 1 dose. Dispense: 2 Vial; Refill: 0    2. Tooth infection    - amoxicillin-clavulanate (AUGMENTIN) 875-125 mg per tablet; Take 1 Tab by mouth two (2) times a day for 10 days. Dispense: 20 Tab; Refill: 0    3. Urinary frequency    - AMB POC URINALYSIS DIP STICK AUTO W/O MICRO    4. Pain    - ketorolac (TORADOL) 30 mg/mL (1 mL) injection; 2 mL by IntraMUSCular route once for 1 dose. Dispense: 2 Vial; Refill: 0    Patient given Toradol 60mg IM. After some time he was reevaluated. Feeling better. Pain improved and no longer dizzy. I have discussed the diagnosis with the patient and the intended management  The patient has received an after-visit summary and questions were answered concerning future plans. I have discussed medication usage, side effects and warnings with the patient as well. I have reviewed the plan of care with the patient, accepted their input and they are in agreement with the treatment goals. Follow-up Disposition:  Return if symptoms worsen or fail to improve.       Tani Burnett MD                .    Stephy Wellington

## 2017-11-08 NOTE — LETTER
NOTIFICATION RETURN TO WORK / SCHOOL 
 
11/8/2017 2:42 PM 
 
Mr. Andrea Whitten [address] [address] To Whom It May Concern: 
 
Andrea Whitten is currently under the care of Doroteo Cordero. He will return to work/school on: 11/9/2017 If there are questions or concerns please have the patient contact our office.  
 
 
 
Sincerely, 
 
 
Mars Crane MD

## 2017-11-08 NOTE — MR AVS SNAPSHOT
Visit Information Date & Time Provider Department Dept. Phone Encounter #  
 11/8/2017  1:45 PM Francine Abbott [phone]  [phone] [id number] Upcoming Health Maintenance Date Due DTaP/Tdap/Td series (1 - Tdap) 3/9/1999 Allergies as of 11/8/2017  Review Complete On: 11/8/2017 By: Joseph Macedo, GERI, RT, NM, ARRT Severity Noted Reaction Type Reactions Prednisone Medium 10/24/2012    Swelling, Hives Other reaction(s): neurological reaction Rash & headaches Phenergan [Promethazine]  05/23/2011    Other (comments) HALLUCINATIONS 
hallucinations Tramadol  10/24/2012    Other (comments), Nausea Only Patient complains of headaches. Current Immunizations  Never Reviewed Name Date Influenza Vaccine PF 10/19/2015 Not reviewed this visit You Were Diagnosed With   
  
 Codes Comments Tooth pain    -  Primary ICD-10-CM: J19.68 ICD-9-CM: 525.9 Tooth infection     ICD-10-CM: K04.7 ICD-9-CM: 522.4 Urinary frequency     ICD-10-CM: R35.0 ICD-9-CM: 788.41 Vitals BP Pulse Temp Resp Height(growth percentile) Weight(growth percentile) (!) 149/102 (BP 1 Location: Left arm, BP Patient Position: Supine) (!) 106 97.3 °F (36.3 °C) (Oral) 18 6' (1.829 m) 227 lb 3.2 oz (103.1 kg) SpO2 BMI Smoking Status 96% 30.81 kg/m2 Never Smoker Vitals History BMI and BSA Data Body Mass Index Body Surface Area  
 30.81 kg/m 2 2.29 m 2 Preferred Pharmacy Pharmacy Name Phone [facility] [address] [place] [address], [address] [place] [address] [phone] Your Updated Medication List  
  
   
This list is accurate as of: 11/8/17  2:16 PM.  Always use your most recent med list.  
  
  
  
  
 ADDERALL 30 mg tablet Generic drug:  dextroamphetamine-amphetamine Take 30 mg by mouth three (3) times daily. amoxicillin-clavulanate 875-125 mg per tablet Commonly known as:  AUGMENTIN Take 1 Tab by mouth two (2) times a day for 10 days. HYDROcodone-acetaminophen 5-325 mg per tablet Commonly known as:  [address] Take 1 Tab by mouth two (2) times daily as needed for Pain. Max Daily Amount: 2 Tabs.  
  
 magic mouthwash solution Magic mouth wash  Maalox Lidocaine 2% viscous  Diphenhydramine oral solution   Swish and spit 5ml QID prn pain Pharmacy to mix equal portions of ingredients to a total volume as indicated in the dispense amount. nabumetone 750 mg tablet Commonly known as:  RELAFEN Take 1 Tab by mouth two (2) times a day. SUMAtriptan 50 mg tablet Commonly known as:  IMITREX  
1 tab at onset ok to use second tablet if headache not resolved in 2 hours. Max of 2 daily TRAZODONE PO Take  by mouth.  
  
 verapamil 40 mg tablet Commonly known as:  CALAN  
TAKE ONE TABLET BY MOUTH THREE TIMES DAILY WITH MEALS FOR CLUSTER HEADACHE PREVENTION (GENERIC CALAN) XANAX 0.5 mg tablet Generic drug:  ALPRAZolam  
Take  by mouth. Prescriptions Printed Refills HYDROcodone-acetaminophen (NORCO) 5-325 mg per tablet 0 Sig: Take 1 Tab by mouth two (2) times daily as needed for Pain. Max Daily Amount: 2 Tabs. Class: Print Route: Oral  
 magic mouthwash solution 0 Sig: Magic mouth wash Maalox Lidocaine 2% viscous Diphenhydramine oral solution Swish and spit 5ml QID prn pain Pharmacy to mix equal portions of ingredients to a total volume as indicated in the dispense amount. Class: Print Prescriptions Sent to Pharmacy Refills  
 amoxicillin-clavulanate (AUGMENTIN) 875-125 mg per tablet 0 Sig: Take 1 Tab by mouth two (2) times a day for 10 days. Class: Normal  
 Pharmacy: [facility] [address] [facility], [address] Jesus Gallegos [facility] #: [phone] Route: Oral  
  
We Performed the Following AMB POC URINALYSIS DIP STICK AUTO W/O MICRO [98005 CPT(R)] Patient Instructions Please see dentist to get tooth pulled. Tooth and Gum Pain: Care Instructions Your Care Instructions The most common causes of dental pain are tooth decay and gum disease. Pain can also be caused by an infection of the tooth (abscess) or the gums. Or you may have pain from a broken or cracked tooth. Other causes of pain include infection and damage to a tooth from nervous grinding of your teeth. A wisdom tooth can be painful when it is coming in but cannot break through the gum. It can also be painful when the tooth is only partway in and extra gum tissue has formed around it. The tissue can get inflamed (pericoronitis), and sometimes it gets infected. Prompt dental care can help find the cause of your toothache and keep the tooth from dying or gum disease from getting worse. Self-care at home may reduce your pain and discomfort. Follow-up care is a key part of your treatment and safety. Be sure to make and go to all appointments, and call your dentist or doctor if you are having problems. It's also a good idea to know your test results and keep a list of the medicines you take. How can you care for yourself at home? · To reduce pain and facial swelling, put an ice or cold pack on the outside of your cheek for 10 to 20 minutes at a time. Put a thin cloth between the ice and your skin. Do not use heat. · If your doctor prescribed antibiotics, take them as directed. Do not stop taking them just because you feel better. You need to take the full course of antibiotics. · Ask your doctor if you can take an over-the-counter pain medicine, such as acetaminophen (Tylenol), ibuprofen (Advil, Motrin), or naproxen (Aleve). Be safe with medicines. Read and follow all instructions on the label. · Avoid very hot, cold, or sweet foods and drinks if they increase your pain.  
· Rinse your mouth with warm salt water every 2 hours to help relieve pain and swelling. Mix 1 teaspoon of salt in 8 ounces of water. · Talk to your dentist about using special toothpaste for sensitive teeth. To reduce pain on contact with heat or cold or when brushing, brush with this toothpaste regularly or rub a small amount of the paste on the sensitive area with a clean finger 2 or 3 times a day. Floss gently between your teeth. · Do not smoke or use spit tobacco. Tobacco use can make gum problems worse, decreases your ability to fight infection in your gums, and delays healing. If you need help quitting, talk to your doctor about stop-smoking programs and medicines. These can increase your chances of quitting for good. When should you call for help? Call 911 anytime you think you may need emergency care. For example, call if: 
? · You have trouble breathing. ?Call your dentist or doctor now or seek immediate medical care if: 
? · You have signs of infection, such as: 
¨ Increased pain, swelling, warmth, or redness. ¨ Red streaks leading from the area. ¨ Pus draining from the area. ¨ A fever. ? Watch closely for changes in your health, and be sure to contact your doctor if: 
? · You do not get better as expected. Where can you learn more? Go to http://alize-maryann.info/. Enter [id number] [id number] in the search box to learn more about \"Tooth and Gum Pain: Care Instructions. \" Current as of: May 12, 2017 Content Version: 11.4 © 4626-5308 [employer]. Care instructions adapted under license by [employer] (which disclaims liability or warranty for this information). If you have questions about a medical condition or this instruction, always ask your healthcare professional. [place] [address] any warranty or liability for your use of this information. Introducing [facility]!    
 Cassidy Haro introduces [employer] patient portal. Now you can access parts of your medical record, email your doctor's office, and request medication refills online. 1. In your internet browser, go to [email or web address] 2. Click on the First Time User? Click Here link in the Sign In box. You will see the New Member Sign Up page. 3. Enter your [employer] Access Code exactly as it appears below. You will not need to use this code after youve completed the sign-up process. If you do not sign up before the expiration date, you must request a new code. · [employer] Access Code: [email or web address] Expires: 1/22/2018 10:26 AM 
 
4. Enter the last four digits of your Social Security Number (xxxx) and Date of Birth (mm/dd/yyyy) as indicated and click Submit. You will be taken to the next sign-up page. 5. Create a [employer] ID. This will be your [employer] login ID and cannot be changed, so think of one that is secure and easy to remember. 6. Create a [employer] password. You can change your password at any time. 7. Enter your Password Reset Question and Answer. This can be used at a later time if you forget your password. 8. Enter your e-mail address. You will receive e-mail notification when new information is available in [address] 9. Click Sign Up. You can now view and download portions of your medical record. 10. Click the Download Summary menu link to download a portable copy of your medical information. If you have questions, please visit the Frequently Asked Questions section of the [employer] website. Remember, [employer] is NOT to be used for urgent needs. For medical emergencies, dial 911. Now available from your iPhone and Android! Please provide this summary of care documentation to your next provider. Your primary care clinician is listed as Rae Smith. If you have any questions after today's visit, please call [phone].

## 2017-11-08 NOTE — PATIENT INSTRUCTIONS
Please see dentist to get tooth pulled. Tooth and Gum Pain: Care Instructions  Your Care Instructions    The most common causes of dental pain are tooth decay and gum disease. Pain can also be caused by an infection of the tooth (abscess) or the gums. Or you may have pain from a broken or cracked tooth. Other causes of pain include infection and damage to a tooth from nervous grinding of your teeth. A wisdom tooth can be painful when it is coming in but cannot break through the gum. It can also be painful when the tooth is only partway in and extra gum tissue has formed around it. The tissue can get inflamed (pericoronitis), and sometimes it gets infected. Prompt dental care can help find the cause of your toothache and keep the tooth from dying or gum disease from getting worse. Self-care at home may reduce your pain and discomfort. Follow-up care is a key part of your treatment and safety. Be sure to make and go to all appointments, and call your dentist or doctor if you are having problems. It's also a good idea to know your test results and keep a list of the medicines you take. How can you care for yourself at home? · To reduce pain and facial swelling, put an ice or cold pack on the outside of your cheek for 10 to 20 minutes at a time. Put a thin cloth between the ice and your skin. Do not use heat. · If your doctor prescribed antibiotics, take them as directed. Do not stop taking them just because you feel better. You need to take the full course of antibiotics. · Ask your doctor if you can take an over-the-counter pain medicine, such as acetaminophen (Tylenol), ibuprofen (Advil, Motrin), or naproxen (Aleve). Be safe with medicines. Read and follow all instructions on the label. · Avoid very hot, cold, or sweet foods and drinks if they increase your pain. · Rinse your mouth with warm salt water every 2 hours to help relieve pain and swelling. Mix 1 teaspoon of salt in 8 ounces of water.   · Talk to your dentist about using special toothpaste for sensitive teeth. To reduce pain on contact with heat or cold or when brushing, brush with this toothpaste regularly or rub a small amount of the paste on the sensitive area with a clean finger 2 or 3 times a day. Floss gently between your teeth. · Do not smoke or use spit tobacco. Tobacco use can make gum problems worse, decreases your ability to fight infection in your gums, and delays healing. If you need help quitting, talk to your doctor about stop-smoking programs and medicines. These can increase your chances of quitting for good. When should you call for help? Call 911 anytime you think you may need emergency care. For example, call if:  ? · You have trouble breathing. ?Call your dentist or doctor now or seek immediate medical care if:  ? · You have signs of infection, such as:  ¨ Increased pain, swelling, warmth, or redness. ¨ Red streaks leading from the area. ¨ Pus draining from the area. ¨ A fever. ? Watch closely for changes in your health, and be sure to contact your doctor if:  ? · You do not get better as expected. Where can you learn more? Go to http://alize-maryann.info/. Enter [id number] [id number] in the search box to learn more about \"Tooth and Gum Pain: Care Instructions. \"  Current as of: May 12, 2017  Content Version: 11.4  © 4698-4452 [employer]. Care instructions adapted under license by [employer] (which disclaims liability or warranty for this information). If you have questions about a medical condition or this instruction, always ask your healthcare professional. [place] [address] any warranty or liability for your use of this information.

## 2017-11-08 NOTE — PROGRESS NOTES
Víctor Kwong is a 42 y.o. male c/o wisdom tooth pain, feels lightheaded, like he is going to pass out. Feels constantly thirsty and has frequent urination.

## 2018-05-07 NOTE — PROGRESS NOTES
Mr. Sindhu Mathews has a reminder for a \"due or due soon\" health maintenance. I have asked that he contact his primary care provider for follow-up on this health maintenance. RBV Per Dr. Sandie Gibosn Gentamicin 80mg/2ml given IM in right gluteus. Patient tolerated well. RBV Per Dr. Sandie Gibson Gentamicin 80mg/2ml given IM in left glutues. Patient tolerated well. RBV Per Dr. Sandie Gibson Toradol 30mg/1ml given IM in left deltoid. Patient tolerated well. hyperglycemia
